# Patient Record
Sex: FEMALE | Race: WHITE | Employment: FULL TIME | URBAN - METROPOLITAN AREA
[De-identification: names, ages, dates, MRNs, and addresses within clinical notes are randomized per-mention and may not be internally consistent; named-entity substitution may affect disease eponyms.]

---

## 2019-07-22 ENCOUNTER — HOSPITAL ENCOUNTER (EMERGENCY)
Age: 49
Discharge: HOME | End: 2019-07-22
Payer: COMMERCIAL

## 2019-07-22 ENCOUNTER — APPOINTMENT (OUTPATIENT)
Dept: GENERAL RADIOLOGY | Age: 49
End: 2019-07-22
Payer: COMMERCIAL

## 2019-07-22 VITALS
DIASTOLIC BLOOD PRESSURE: 79 MMHG | HEIGHT: 67 IN | OXYGEN SATURATION: 100 % | BODY MASS INDEX: 29.19 KG/M2 | HEART RATE: 78 BPM | RESPIRATION RATE: 18 BRPM | WEIGHT: 186 LBS | TEMPERATURE: 98.1 F | SYSTOLIC BLOOD PRESSURE: 131 MMHG

## 2019-07-22 DIAGNOSIS — S62.639B OPEN FRACTURE OF TUFT OF DISTAL PHALANX OF FINGER: Primary | ICD-10-CM

## 2019-07-22 PROCEDURE — 73140 X-RAY EXAM OF FINGER(S): CPT

## 2019-07-22 PROCEDURE — 90715 TDAP VACCINE 7 YRS/> IM: CPT | Performed by: PHYSICIAN ASSISTANT

## 2019-07-22 PROCEDURE — 6370000000 HC RX 637 (ALT 250 FOR IP): Performed by: PHYSICIAN ASSISTANT

## 2019-07-22 PROCEDURE — 90471 IMMUNIZATION ADMIN: CPT | Performed by: PHYSICIAN ASSISTANT

## 2019-07-22 PROCEDURE — 6360000002 HC RX W HCPCS: Performed by: PHYSICIAN ASSISTANT

## 2019-07-22 PROCEDURE — 99283 EMERGENCY DEPT VISIT LOW MDM: CPT

## 2019-07-22 RX ORDER — CEPHALEXIN 500 MG/1
500 CAPSULE ORAL 3 TIMES DAILY
Qty: 21 CAPSULE | Refills: 0 | Status: SHIPPED | OUTPATIENT
Start: 2019-07-22 | End: 2019-07-29

## 2019-07-22 RX ORDER — ACETAMINOPHEN 500 MG
1000 TABLET ORAL ONCE
Status: COMPLETED | OUTPATIENT
Start: 2019-07-22 | End: 2019-07-22

## 2019-07-22 RX ORDER — TRAMADOL HYDROCHLORIDE 50 MG/1
50 TABLET ORAL ONCE
Status: COMPLETED | OUTPATIENT
Start: 2019-07-22 | End: 2019-07-22

## 2019-07-22 RX ORDER — TRAMADOL HYDROCHLORIDE 50 MG/1
50 TABLET ORAL EVERY 6 HOURS PRN
Qty: 12 TABLET | Refills: 0 | Status: SHIPPED | OUTPATIENT
Start: 2019-07-22 | End: 2019-07-25

## 2019-07-22 RX ORDER — CEPHALEXIN 250 MG/1
500 CAPSULE ORAL ONCE
Status: COMPLETED | OUTPATIENT
Start: 2019-07-22 | End: 2019-07-22

## 2019-07-22 RX ADMIN — CEPHALEXIN 500 MG: 250 CAPSULE ORAL at 22:32

## 2019-07-22 RX ADMIN — TETANUS TOXOID, REDUCED DIPHTHERIA TOXOID AND ACELLULAR PERTUSSIS VACCINE, ADSORBED 0.5 ML: 5; 2.5; 8; 8; 2.5 SUSPENSION INTRAMUSCULAR at 21:40

## 2019-07-22 RX ADMIN — ACETAMINOPHEN 1000 MG: 500 TABLET ORAL at 21:40

## 2019-07-22 RX ADMIN — TRAMADOL HYDROCHLORIDE 50 MG: 50 TABLET, FILM COATED ORAL at 22:50

## 2019-07-22 ASSESSMENT — PAIN SCALES - GENERAL
PAINLEVEL_OUTOF10: 8

## 2019-07-23 NOTE — ED PROVIDER NOTES
EMERGENCY DEPARTMENT ENCOUNTER        CHIEF COMPLAINT    Chief Complaint   Patient presents with    Hand Injury         HPI    Crystal Esparza is a 57 y.o. female who presents with a hand injury. Onset was just PTA. Context was patient smashed finger between table and wall. Has 8/10 pain to distal right 5th finger and bruising, laceration to medial nail fold. Patient reports associated bleeding. Patient is not up-to-date on Tetanus. REVIEW OF SYSTEMS    See HPI for further details. Review of systems otherwise negative. Constitutional:  Denies fever, chills. Musculoskeletal:  Denies edema. + finger pain  Integument:  + laceration  Neurologic:  Denies any numbness or tingling. PAST MEDICAL HISTORY    Past Medical History:   Diagnosis Date    Back pain     Ovarian cyst          SURGICAL HISTORY    Past Surgical History:   Procedure Laterality Date    KNEE SURGERY      TONSILLECTOMY      TUBAL LIGATION           CURRENT MEDICATIONS          ALLERGIES    Allergies   Allergen Reactions    Ibuprofen Nausea And Vomiting    Naproxen Nausea And Vomiting         FAMILY HISTORY    History reviewed. No pertinent family history.       SOCIAL HISTORY    Social History     Socioeconomic History    Marital status: Legally      Spouse name: None    Number of children: None    Years of education: None    Highest education level: None   Occupational History    None   Social Needs    Financial resource strain: None    Food insecurity:     Worry: None     Inability: None    Transportation needs:     Medical: None     Non-medical: None   Tobacco Use    Smoking status: Current Every Day Smoker     Packs/day: 1.00     Types: Cigarettes    Smokeless tobacco: Never Used   Substance and Sexual Activity    Alcohol use: Yes     Comment: occ    Drug use: Yes     Types: Marijuana    Sexual activity: None   Lifestyle    Physical activity:     Days per week: None     Minutes per session: None   

## 2019-07-24 ENCOUNTER — TELEPHONE (OUTPATIENT)
Dept: ORTHOPEDIC SURGERY | Age: 49
End: 2019-07-24

## 2019-07-29 ENCOUNTER — OFFICE VISIT (OUTPATIENT)
Dept: ORTHOPEDIC SURGERY | Age: 49
End: 2019-07-29
Payer: COMMERCIAL

## 2019-07-29 VITALS — BODY MASS INDEX: 29.98 KG/M2 | WEIGHT: 191 LBS | RESPIRATION RATE: 16 BRPM | HEIGHT: 67 IN

## 2019-07-29 DIAGNOSIS — S62.636B OPEN DISPLACED FRACTURE OF DISTAL PHALANX OF RIGHT LITTLE FINGER, INITIAL ENCOUNTER: Primary | ICD-10-CM

## 2019-07-29 PROCEDURE — G8427 DOCREV CUR MEDS BY ELIG CLIN: HCPCS | Performed by: PHYSICIAN ASSISTANT

## 2019-07-29 PROCEDURE — 26750 TREAT FINGER FRACTURE EACH: CPT | Performed by: PHYSICIAN ASSISTANT

## 2019-07-29 PROCEDURE — 99202 OFFICE O/P NEW SF 15 MIN: CPT | Performed by: PHYSICIAN ASSISTANT

## 2019-07-29 PROCEDURE — 4004F PT TOBACCO SCREEN RCVD TLK: CPT | Performed by: PHYSICIAN ASSISTANT

## 2019-07-29 PROCEDURE — G8419 CALC BMI OUT NRM PARAM NOF/U: HCPCS | Performed by: PHYSICIAN ASSISTANT

## 2019-07-29 RX ORDER — HYDROCODONE BITARTRATE AND ACETAMINOPHEN 5; 325 MG/1; MG/1
1 TABLET ORAL EVERY 8 HOURS PRN
Qty: 21 TABLET | Refills: 0 | Status: SHIPPED | OUTPATIENT
Start: 2019-07-29 | End: 2019-08-05

## 2019-07-29 ASSESSMENT — ENCOUNTER SYMPTOMS
RESPIRATORY NEGATIVE: 1
EYES NEGATIVE: 1
GASTROINTESTINAL NEGATIVE: 1

## 2019-07-29 NOTE — PROGRESS NOTES
I reviewed and agree with the portions of the HPI, review of systems, vital documentation and plan performed by my staff and have added/addended where appropriate. Review of Systems   Constitutional: Negative. HENT: Negative. Eyes: Negative. Respiratory: Negative. Cardiovascular: Negative. Gastrointestinal: Negative. Genitourinary: Negative. Musculoskeletal: Positive for arthralgias, joint swelling and myalgias. Skin: Negative. Neurological: Negative. Psychiatric/Behavioral: Negative. HPI:  Chasity Mackey is a 57y.o. year old female who is seen regarding an injury occurring on July 22nd 2019. She reports having her hand pinned against a wall by a steel end of a table while moving it and injuring the Right small finger. She was seen for Emergency evaluation and she was treated with finger foam splint, ultram, tylenol and keflex. Symptoms stable over time. She is right hand dominant. She states that the pain in her finger is a 7/10, throbbing, worse at night really. She denies any numbness or tingling in the injured extremity. Disorders to the injured extremity prior to this injury: negative for prior surgery, trauma, arthritis or disorders    Referred by ED for the above. Past Medical History:   Diagnosis Date    Back pain     Ovarian cyst        Past Surgical History:   Procedure Laterality Date    KNEE SURGERY      TONSILLECTOMY      TUBAL LIGATION         History reviewed. No pertinent family history.     Social History     Socioeconomic History    Marital status: Legally      Spouse name: None    Number of children: None    Years of education: None    Highest education level: None   Occupational History    None   Social Needs    Financial resource strain: None    Food insecurity:     Worry: None     Inability: None    Transportation needs:     Medical: None     Non-medical: None   Tobacco Use    Smoking status: Current Every Day Smoker     Packs/day: 1.00     Types: Cigarettes    Smokeless tobacco: Never Used   Substance and Sexual Activity    Alcohol use: Yes     Comment: occ    Drug use: Yes     Types: Marijuana    Sexual activity: None   Lifestyle    Physical activity:     Days per week: None     Minutes per session: None    Stress: None   Relationships    Social connections:     Talks on phone: None     Gets together: None     Attends Oriental orthodox service: None     Active member of club or organization: None     Attends meetings of clubs or organizations: None     Relationship status: None    Intimate partner violence:     Fear of current or ex partner: None     Emotionally abused: None     Physically abused: None     Forced sexual activity: None   Other Topics Concern    None   Social History Narrative    None       Current Outpatient Medications   Medication Sig Dispense Refill    cephALEXin (KEFLEX) 500 MG capsule Take 1 capsule by mouth 3 times daily for 7 days 21 capsule 0     No current facility-administered medications for this visit. Allergies   Allergen Reactions    Divalproex Sodium     Gabapentin     Methocarbamol     Pentazocine     Ibuprofen Nausea And Vomiting    Naproxen Nausea And Vomiting       Review of Systems:  See above      Physical Exam:  Ms. Nahid Huber's most recent vitals:  Vitals  Resp: 16  Height: 5' 7\" (170.2 cm)  Weight: 191 lb (86.6 kg)    Gait is normal    Gen/Psych: Examination reveals a pleasantindividual in no acute distress. The patient is oriented to time, place and person. The patient's mood and affect are appropriate.     Lymph: The lymphatic examination bilaterally reveals all areas to be without enlargement or induration.      Skin intact without lymphadenopathy, discoloration, or abnormal temperature.      Vascular: There is intact, symmetric circulation in both upper extremities.       right Hand exam  Inspection: Right small digit examined showing healed lacerations

## 2022-04-14 ENCOUNTER — OFFICE VISIT (OUTPATIENT)
Dept: ORTHOPEDIC SURGERY | Age: 52
End: 2022-04-14
Payer: COMMERCIAL

## 2022-04-14 VITALS
RESPIRATION RATE: 15 BRPM | HEIGHT: 67 IN | HEART RATE: 71 BPM | OXYGEN SATURATION: 99 % | BODY MASS INDEX: 29.98 KG/M2 | WEIGHT: 191 LBS

## 2022-04-14 DIAGNOSIS — M65.341 ACQUIRED TRIGGER FINGER OF RIGHT RING FINGER: Primary | ICD-10-CM

## 2022-04-14 DIAGNOSIS — M77.8 RIGHT WRIST TENDINITIS: ICD-10-CM

## 2022-04-14 PROCEDURE — G8427 DOCREV CUR MEDS BY ELIG CLIN: HCPCS | Performed by: ORTHOPAEDIC SURGERY

## 2022-04-14 PROCEDURE — G8419 CALC BMI OUT NRM PARAM NOF/U: HCPCS | Performed by: ORTHOPAEDIC SURGERY

## 2022-04-14 PROCEDURE — 4004F PT TOBACCO SCREEN RCVD TLK: CPT | Performed by: ORTHOPAEDIC SURGERY

## 2022-04-14 PROCEDURE — 99214 OFFICE O/P EST MOD 30 MIN: CPT | Performed by: ORTHOPAEDIC SURGERY

## 2022-04-14 NOTE — PROGRESS NOTES
4/14/2022   Chief Complaint   Patient presents with    Hand Pain     right ring trigger finger    Hand Pain     left hand        History of Present Illness:                             Reshma Pineda is a 54 y.o. female who presents today for evaluation of her bilateral right wrist and left hand pain. She complains of constant stiffness and swelling and pain along the palmar aspect of the right hand ring finger. She has triggering and popping during active range of motion is difficult time fully extending it. The finger frequently locks and has to pull it loose. She cannot perform gripping and lifting activities because of severe pain along the ring finger during gripping and lifting activities. Has a history of previous trauma to the right hand. She has noticed some swelling and pain along the dorsal aspect of the wrist especially with certain movements and extremes of motion of the wrist during flexion and extension. She initially had a traumatic fracture of the small finger which healed well with conservative measures but her wrist has been stiff and painful since her injury couple years ago. Patient presents to the office with bilateral hand pain. Pt stated her right is significantly worse than her left. Pt has a trigger finger on her ring finger that will latch and cause significant pain. Pt stated that she has used arthritis creams, ibuprofen and stretching with short-term relief. Pt has a autistic child with violent tendencies and stated that he will bend and put pressure on the hand and finger that will increase the pain dramatically. Pt denies any injuries recently with the hand. Medical History  Patient's medications, allergies, past medical, surgical, social and family histories were reviewed and updated as appropriate.     Past Medical History:   Diagnosis Date    Back pain     Ovarian cyst      Past Surgical History:   Procedure Laterality Date    KNEE SURGERY      TONSILLECTOMY      TUBAL LIGATION       No family history on file. Social History     Socioeconomic History    Marital status: Legally      Spouse name: None    Number of children: None    Years of education: None    Highest education level: None   Occupational History    None   Tobacco Use    Smoking status: Current Every Day Smoker     Packs/day: 1.00     Types: Cigarettes    Smokeless tobacco: Never Used   Substance and Sexual Activity    Alcohol use: Yes     Comment: occ    Drug use: Yes     Types: Marijuana Curlie Opal)    Sexual activity: None   Other Topics Concern    None   Social History Narrative    None     Social Determinants of Health     Financial Resource Strain:     Difficulty of Paying Living Expenses: Not on file   Food Insecurity:     Worried About Running Out of Food in the Last Year: Not on file    Carmen of Food in the Last Year: Not on file   Transportation Needs:     Lack of Transportation (Medical): Not on file    Lack of Transportation (Non-Medical):  Not on file   Physical Activity:     Days of Exercise per Week: Not on file    Minutes of Exercise per Session: Not on file   Stress:     Feeling of Stress : Not on file   Social Connections:     Frequency of Communication with Friends and Family: Not on file    Frequency of Social Gatherings with Friends and Family: Not on file    Attends Confucianism Services: Not on file    Active Member of [address] [place] [address] [employer] or Organizations: Not on file    Attends Club or Organization Meetings: Not on file    Marital Status: Not on file   Intimate Partner Violence:     Fear of Current or Ex-Partner: Not on file    Emotionally Abused: Not on file    Physically Abused: Not on file    Sexually Abused: Not on file   Housing Stability:     Unable to Pay for Housing in the Last Year: Not on file    Number of [place] in the Last Year: Not on file    Unstable Housing in the Last Year: Not on file     No current outpatient medications on file.     No current facility-administered medications for this visit. Allergies   Allergen Reactions    Bupropion Shortness Of Breath    Divalproex Sodium     Gabapentin     Methocarbamol     Pentazocine     Ibuprofen Nausea And Vomiting    Naproxen Nausea And Vomiting         Review of Systems   Constitutional: Negative for chills and fever. HENT: Negative for congestion and sneezing. Eyes: Negative for pain and redness. Respiratory: Negative for chest tightness, shortness of breath and wheezing. Cardiovascular: Negative for chest pain and palpitations. Gastrointestinal: Negative for vomiting. Musculoskeletal: Positive for arthralgias. Skin: Negative for color change and rash. Neurological: Negative for weakness and numbness. Psychiatric/Behavioral: Negative for agitation. The patient is not nervous/anxious. Examination:  General Exam:  Vitals: Pulse 71   Resp 15   Ht 5' 7\" (1.702 m)   Wt 191 lb (86.6 kg)   SpO2 99%   BMI 29.91 kg/m²    Physical Exam  Vitals and nursing note reviewed. Constitutional:       Appearance: Normal appearance. HENT:      Head: Normocephalic and atraumatic. Eyes:      Conjunctiva/sclera: Conjunctivae normal.      Pupils: Pupils are equal, round, and reactive to light. Pulmonary:      Effort: Pulmonary effort is normal.   Musculoskeletal:      Right elbow: No swelling or effusion. Normal range of motion. No tenderness. Right forearm: No swelling, edema, tenderness or bony tenderness. Left wrist: Normal.      Left hand: No swelling, deformity, tenderness or bony tenderness. Normal range of motion. Normal strength. Normal strength of finger abduction, thumb/finger opposition and wrist extension. Normal sensation. There is no disruption of two-point discrimination. Normal capillary refill. Cervical back: Normal range of motion.       Comments: Right Upper Extremity:    There is moderate to severe tenderness to palpation over the A1 pulley of the ring finger. There is mild swelling diffusely throughout the digit primarily along the course of the flexor tendon. There is active triggering with range of motion of the ring finger. There is pain along the volar aspect of the ring finger with passive stretch extension across the MP and PIP joints of the ring finger. Active range of motion is mildly limited both in flexion and extension across the MP and IP joints of the ring finger. Sensation is intact to light touch throughout the hand in the median, ulnar, radial nerve distributions. Skin is intact, no erythema or wounds. 2+ radial pulse. Strength is 5/5 throughout finger MP and IP flexion and extension in the hand    There is tenderness palpation along the dorsal ulnar aspect of the wrist primarily along the extensor carpi ulnaris tendon sheath at the level of the radiocarpal joint. There is pain with resisted wrist extension referred to the ulnar aspect of the wrist along the course of the extensor carpi ulnaris tendon. Strength is 5 out of 5 with wrist flexion and extension however there is pain referred to the ulnar aspect of the wrist during resisted wrist extension. Skin:     General: Skin is warm and dry. Neurological:      Mental Status: She is alert and oriented to person, place, and time.    Psychiatric:         Mood and Affect: Mood normal.         Behavior: Behavior normal.            Diagnostic testing:  X-ray images were reviewed by myself and discussed with the patient:  X-ray right hand:   3 views of the hand wrist show normal alignment of the articulations of    the fingers, wrist and carpal joints.  No evidence of fracture or acute    abnormality.  No soft tissue swelling.  No loose bodies.  Normal bone    density.       Impression: Normal right hand                   X-ray left hand:   3 views of the hand wrist show normal alignment of the articulations of    the fingers, wrist and carpal joints.  No evidence of fracture or acute    abnormality.  No soft tissue swelling.  No loose bodies.  Normal bone    density.       Impression: Normal left hand             Office Procedures:  No orders of the defined types were placed in this encounter. Assessment and Plan  1. Right ring finger trigger finger    2. Right wrist extensor carpi ulnaris tendinitis    We discussed the diagnosis of trigger finger at the right hand. We also discussed treatment options. Given the severity of her mechanical symptoms and stiffness present, I recommended surgical intervention for trigger finger release. We discussed the potential for the need of hand therapy in order to regain strength and range of motion of the finger even after successful release of the trigger finger. We also discussed the inflammatory condition that is present at the hand involving the wrist and small finger. I expect that this would respond to a steroid injection and rehabilitation activities with bracing as needed. I recommended we proceed with injection of the inflamed tendon sheath at the time of her trigger finger release. We discussed the goals of surgery as well as the rehabilitation. Discussed need for regular stretching exercises and advancement of activities following surgery. We discussed pertinent risks including wound healing problems, infection, stiffness, persistent pain, scar tissue, and other complications. She understands and would like to proceed. All of her questions were answered.     Plan will be to proceed with right ring finger trigger finger release and right wrist injection under monitored anesthesia care        Electronically signed by Hiro Palmer MD on 4/14/2022 at 9:30 AM

## 2022-04-14 NOTE — PATIENT INSTRUCTIONS
We will schedule surgery at soonest convenience.  If you have any questions regarding your surgery please call our office and ask to speak with Eric Hylton [phone]

## 2022-04-14 NOTE — PROGRESS NOTES
Patient presents to the office with bilateral hand pain. Pt stated her right is significantly worse than her left. Pt has a trigger finger on her ring finger that will latch and cause significant pain. Pt stated that she has used arthritis creams, ibuprofen and stretching with short-term relief. Pt has a autistic child with violent tendencies and stated that he will bend and put pressure on the hand and finger that will increase the pain dramatically. Pt denies any injuries recently with the hand.

## 2022-04-20 ASSESSMENT — ENCOUNTER SYMPTOMS
WHEEZING: 0
SHORTNESS OF BREATH: 0
EYE PAIN: 0
EYE REDNESS: 0
COLOR CHANGE: 0
CHEST TIGHTNESS: 0
VOMITING: 0

## 2022-04-22 NOTE — PROGRESS NOTES
Covid test done - 4/22/22    Surgery is at [facility] on 5/2/22 . You will be called on 4/29/22  with times. 1. Do not eat or drink anything after midnight - unless instructed by your doctor prior to surgery. This includes no water, chewing gum or mints. 2. Follow your directions as prescribed by the doctor for your procedure and medications. 3. Check with your Doctor regarding stopping vitamins, supplements, blood thinners (Plavix, Coumadin, Lovenox, Effient, Pradaxa, Xarelto, Fragmin or other blood thinners) and follow their instructions. Stop all supplements and herbals 7 days prior to surgery. Morning of surgery     4. Do not smoke, and do not drink any alcoholic beverages 24 hours prior to surgery. This includes NA Beer. 5. You may brush your teeth and gargle the morning of surgery. DO NOT SWALLOW WATER   6. You MUST make arrangements for a responsible adult to take you home after your surgery and be able to check on you every couple hours for the day. You will not be allowed to leave alone or drive yourself home. It is strongly suggested someone stay with you the first 24  hrs. Your surgery will be cancelled if you do not have a ride home. 7. Please wear simple, loose fitting clothing to the hospital.  Sophy Yadavley not bring valuables (money, credit cards, checkbooks, etc.) Do not wear any makeup (including no eye makeup) or nail polish on your fingers or toes. 8. DO NOT wear any jewelry or piercings on day of surgery. All body piercing jewelry must be removed. 9. If you have dentures, they will be removed before going to the OR; we will provide you a container. If you wear contact lenses or glasses,  they will be removed; please bring a case for them. 10. If you  have a Living Will and Durable Power of  for Healthcare, please bring in a copy.            11. Please bring picture ID,  insurance card, paperwork from the doctors office    (H & P, Consent, & card for implantable devices). 12. Take a shower the night before or morning of your procedure, do not apply any lotion, oil or powder. It is recommended to use Hibiclens or CHG wash during pre-op shower/bath.            13. Wear a mask covering your nose & mouth when entering the hospital.

## 2022-04-29 ENCOUNTER — ANESTHESIA EVENT (OUTPATIENT)
Dept: OPERATING ROOM | Age: 52
End: 2022-04-29
Payer: COMMERCIAL

## 2022-04-29 ASSESSMENT — LIFESTYLE VARIABLES: SMOKING_STATUS: 1

## 2022-04-29 NOTE — PROGRESS NOTES
Called and confirmed with patient surgery at [facility]  on 5/2/22 at [address] with an arrival time of   0615. Come into the Main entrance and check in. You can bring two people with you and wear a mask. Patient verbalized understanding.

## 2022-04-29 NOTE — ANESTHESIA PRE PROCEDURE
Department of Anesthesiology  Preprocedure Note       Name:  Johnathan Chiu   Age:  54 y.o.  :  1970                                          MRN:  [id number]         Date:  2022      Surgeon: Loki Torres):  Eloy Sarmiento MD    Procedure: Procedure(s):  RIGHT RING FINGER TRIGGER RELEASE  RIGHT WRIST INJECTION    Medications prior to admission:   Prior to Admission medications    Not on File       Current medications:    No current facility-administered medications for this encounter. No current outpatient medications on file. Allergies:     Allergies   Allergen Reactions    Bupropion Shortness Of Breath    Divalproex Sodium Other (See Comments)     Makes me feel horible    Gabapentin Other (See Comments)     Mood change    Methocarbamol Other (See Comments)     Makes me sleep and groggy, with dizziness    Ibuprofen Nausea And Vomiting    Naproxen Nausea And Vomiting       Problem List:    Patient Active Problem List   Diagnosis Code    Acquired trigger finger of right ring finger M65.341    Right wrist tendinitis M77.8       Past Medical History:        Diagnosis Date    Back pain     Ovarian cyst        Past Surgical History:        Procedure Laterality Date    KNEE SURGERY Right     two times - Scoped and ACL repaired     KNEE SURGERY Left     scoped    TONSILLECTOMY      TUBAL LIGATION         Social History:    Social History     Tobacco Use    Smoking status: Current Every Day Smoker     Packs/day: 1.00     Types: Cigarettes    Smokeless tobacco: Never Used   Substance Use Topics    Alcohol use: Yes     Comment: occ                                Ready to quit: Not Answered  Counseling given: Not Answered      Vital Signs (Current):   Vitals:    22 1305   Weight: 190 lb (86.2 kg)   Height: 5' 7\" (1.702 m)                                              BP Readings from Last 3 Encounters:   19 131/79   18 (!) 128/59   18 124/67       NPO Status: BMI:   Wt Readings from Last 3 Encounters:   04/14/22 191 lb (86.6 kg)   07/29/19 191 lb (86.6 kg)   07/22/19 186 lb (84.4 kg)     Body mass index is 29.76 kg/m². CBC:   Lab Results   Component Value Date    WBC 16.2 09/09/2018    RBC 4.85 09/09/2018    HGB 15.3 09/09/2018    HCT 44.4 09/09/2018    MCV 91.5 09/09/2018    RDW 13.2 09/09/2018     09/09/2018       CMP:   Lab Results   Component Value Date     09/09/2018    K 3.4 09/09/2018     09/09/2018    CO2 21 09/09/2018    BUN 7 09/09/2018    CREATININE 0.6 09/09/2018    GFRAA >60 09/09/2018    LABGLOM >60 09/09/2018    GLUCOSE 109 09/09/2018    PROT 7.3 09/09/2018    CALCIUM 9.6 09/09/2018    BILITOT 0.6 09/09/2018    ALKPHOS 85 09/09/2018    AST 10 09/09/2018    ALT 8 09/09/2018       POC Tests: No results for input(s): POCGLU, POCNA, POCK, POCCL, POCBUN, POCHEMO, POCHCT in the last 72 hours. Coags: No results found for: PROTIME, INR, APTT    HCG (If Applicable): No results found for: PREGTESTUR, PREGSERUM, HCG, HCGQUANT     ABGs: No results found for: PHART, PO2ART, BAP7BYA, VYE6PXW, BEART, G6FABTRE     Type & Screen (If Applicable):  No results found for: LABABO, LABRH    Drug/Infectious Status (If Applicable):  No results found for: HIV, HEPCAB    COVID-19 Screening (If Applicable): No results found for: COVID19        Anesthesia Evaluation    Airway: Mallampati: II  TM distance: >3 FB   Neck ROM: full  Mouth opening: > = 3 FB Dental:    (+) edentulous      Pulmonary:normal exam    (+) current smoker                           Cardiovascular:                      Neuro/Psych:               GI/Hepatic/Renal:             Endo/Other:                     Abdominal:             Vascular: Other Findings:           Anesthesia Plan      MAC     ASA 2     (Chart review)  Induction: intravenous. MIPS: Postoperative opioids intended.   Anesthetic plan and risks discussed with patient. Plan discussed with CRNA.     Attending anesthesiologist reviewed and agrees with Pre Eval content            PAULINE Atwood - CRNA   4/29/2022

## 2022-05-02 ENCOUNTER — ANESTHESIA (OUTPATIENT)
Dept: OPERATING ROOM | Age: 52
End: 2022-05-02
Payer: COMMERCIAL

## 2022-05-02 ENCOUNTER — HOSPITAL ENCOUNTER (OUTPATIENT)
Age: 52
Setting detail: OUTPATIENT SURGERY
Discharge: HOME | End: 2022-05-02
Attending: ORTHOPAEDIC SURGERY | Admitting: ORTHOPAEDIC SURGERY
Payer: COMMERCIAL

## 2022-05-02 VITALS
TEMPERATURE: 98.6 F | SYSTOLIC BLOOD PRESSURE: 86 MMHG | RESPIRATION RATE: 3 BRPM | DIASTOLIC BLOOD PRESSURE: 51 MMHG | OXYGEN SATURATION: 97 %

## 2022-05-02 VITALS
OXYGEN SATURATION: 99 % | RESPIRATION RATE: 16 BRPM | WEIGHT: 190 LBS | BODY MASS INDEX: 29.82 KG/M2 | SYSTOLIC BLOOD PRESSURE: 115 MMHG | HEART RATE: 66 BPM | DIASTOLIC BLOOD PRESSURE: 55 MMHG | HEIGHT: 67 IN | TEMPERATURE: 97.6 F

## 2022-05-02 DIAGNOSIS — M65.341 ACQUIRED TRIGGER FINGER OF RIGHT RING FINGER: ICD-10-CM

## 2022-05-02 DIAGNOSIS — Z01.818 ENCOUNTER FOR PREADMISSION TESTING: Primary | ICD-10-CM

## 2022-05-02 PROCEDURE — 2500000003 HC RX 250 WO HCPCS: Performed by: NURSE ANESTHETIST, CERTIFIED REGISTERED

## 2022-05-02 PROCEDURE — 3600000012 HC SURGERY LEVEL 2 ADDTL 15MIN: Performed by: ORTHOPAEDIC SURGERY

## 2022-05-02 PROCEDURE — 20605 DRAIN/INJ JOINT/BURSA W/O US: CPT | Performed by: ORTHOPAEDIC SURGERY

## 2022-05-02 PROCEDURE — 26055 INCISE FINGER TENDON SHEATH: CPT | Performed by: ORTHOPAEDIC SURGERY

## 2022-05-02 PROCEDURE — 6360000002 HC RX W HCPCS: Performed by: ORTHOPAEDIC SURGERY

## 2022-05-02 PROCEDURE — 26055 INCISE FINGER TENDON SHEATH: CPT

## 2022-05-02 PROCEDURE — 3600000002 HC SURGERY LEVEL 2 BASE: Performed by: ORTHOPAEDIC SURGERY

## 2022-05-02 PROCEDURE — 6360000002 HC RX W HCPCS: Performed by: NURSE ANESTHETIST, CERTIFIED REGISTERED

## 2022-05-02 PROCEDURE — 7100000011 HC PHASE II RECOVERY - ADDTL 15 MIN: Performed by: ORTHOPAEDIC SURGERY

## 2022-05-02 PROCEDURE — 2709999900 HC NON-CHARGEABLE SUPPLY: Performed by: ORTHOPAEDIC SURGERY

## 2022-05-02 PROCEDURE — 2580000003 HC RX 258: Performed by: ANESTHESIOLOGY

## 2022-05-02 PROCEDURE — 3700000000 HC ANESTHESIA ATTENDED CARE: Performed by: ORTHOPAEDIC SURGERY

## 2022-05-02 PROCEDURE — 2500000003 HC RX 250 WO HCPCS: Performed by: ORTHOPAEDIC SURGERY

## 2022-05-02 PROCEDURE — 3700000001 HC ADD 15 MINUTES (ANESTHESIA): Performed by: ORTHOPAEDIC SURGERY

## 2022-05-02 PROCEDURE — 7100000001 HC PACU RECOVERY - ADDTL 15 MIN: Performed by: ORTHOPAEDIC SURGERY

## 2022-05-02 PROCEDURE — 7100000000 HC PACU RECOVERY - FIRST 15 MIN: Performed by: ORTHOPAEDIC SURGERY

## 2022-05-02 PROCEDURE — 7100000010 HC PHASE II RECOVERY - FIRST 15 MIN: Performed by: ORTHOPAEDIC SURGERY

## 2022-05-02 RX ORDER — FENTANYL CITRATE 50 UG/ML
INJECTION, SOLUTION INTRAMUSCULAR; INTRAVENOUS PRN
Status: DISCONTINUED | OUTPATIENT
Start: 2022-05-02 | End: 2022-05-02 | Stop reason: SDUPTHER

## 2022-05-02 RX ORDER — MIDAZOLAM HYDROCHLORIDE 1 MG/ML
INJECTION INTRAMUSCULAR; INTRAVENOUS PRN
Status: DISCONTINUED | OUTPATIENT
Start: 2022-05-02 | End: 2022-05-02 | Stop reason: SDUPTHER

## 2022-05-02 RX ORDER — HYDROCODONE BITARTRATE AND ACETAMINOPHEN 5; 325 MG/1; MG/1
1 TABLET ORAL EVERY 6 HOURS PRN
Qty: 12 TABLET | Refills: 0 | Status: SHIPPED | OUTPATIENT
Start: 2022-05-02 | End: 2022-05-05

## 2022-05-02 RX ORDER — SODIUM CHLORIDE, SODIUM LACTATE, POTASSIUM CHLORIDE, CALCIUM CHLORIDE 600; 310; 30; 20 MG/100ML; MG/100ML; MG/100ML; MG/100ML
INJECTION, SOLUTION INTRAVENOUS CONTINUOUS
Status: DISCONTINUED | OUTPATIENT
Start: 2022-05-02 | End: 2022-05-02 | Stop reason: HOSPADM

## 2022-05-02 RX ORDER — TRIAMCINOLONE ACETONIDE 40 MG/ML
INJECTION, SUSPENSION INTRA-ARTICULAR; INTRAMUSCULAR
Status: COMPLETED | OUTPATIENT
Start: 2022-05-02 | End: 2022-05-02

## 2022-05-02 RX ORDER — MEPERIDINE HYDROCHLORIDE 25 MG/ML
12.5 INJECTION INTRAMUSCULAR; INTRAVENOUS; SUBCUTANEOUS EVERY 5 MIN PRN
Status: DISCONTINUED | OUTPATIENT
Start: 2022-05-02 | End: 2022-05-02 | Stop reason: HOSPADM

## 2022-05-02 RX ORDER — MIDAZOLAM HYDROCHLORIDE 2 MG/2ML
2 INJECTION, SOLUTION INTRAMUSCULAR; INTRAVENOUS
Status: DISCONTINUED | OUTPATIENT
Start: 2022-05-02 | End: 2022-05-02 | Stop reason: HOSPADM

## 2022-05-02 RX ORDER — LIDOCAINE HYDROCHLORIDE 20 MG/ML
INJECTION, SOLUTION EPIDURAL; INFILTRATION; INTRACAUDAL; PERINEURAL PRN
Status: DISCONTINUED | OUTPATIENT
Start: 2022-05-02 | End: 2022-05-02 | Stop reason: SDUPTHER

## 2022-05-02 RX ORDER — KETOROLAC TROMETHAMINE 30 MG/ML
INJECTION, SOLUTION INTRAMUSCULAR; INTRAVENOUS PRN
Status: DISCONTINUED | OUTPATIENT
Start: 2022-05-02 | End: 2022-05-02 | Stop reason: SDUPTHER

## 2022-05-02 RX ORDER — HYDRALAZINE HYDROCHLORIDE 20 MG/ML
10 INJECTION INTRAMUSCULAR; INTRAVENOUS
Status: DISCONTINUED | OUTPATIENT
Start: 2022-05-02 | End: 2022-05-02 | Stop reason: HOSPADM

## 2022-05-02 RX ORDER — DEXTROSE MONOHYDRATE 25 G/50ML
12.5 INJECTION, SOLUTION INTRAVENOUS PRN
Status: DISCONTINUED | OUTPATIENT
Start: 2022-05-02 | End: 2022-05-02 | Stop reason: RX

## 2022-05-02 RX ORDER — ONDANSETRON 2 MG/ML
4 INJECTION INTRAMUSCULAR; INTRAVENOUS
Status: DISCONTINUED | OUTPATIENT
Start: 2022-05-02 | End: 2022-05-02 | Stop reason: HOSPADM

## 2022-05-02 RX ORDER — DEXAMETHASONE SODIUM PHOSPHATE 4 MG/ML
INJECTION, SOLUTION INTRA-ARTICULAR; INTRALESIONAL; INTRAMUSCULAR; INTRAVENOUS; SOFT TISSUE PRN
Status: DISCONTINUED | OUTPATIENT
Start: 2022-05-02 | End: 2022-05-02 | Stop reason: SDUPTHER

## 2022-05-02 RX ORDER — FENTANYL CITRATE 50 UG/ML
50 INJECTION, SOLUTION INTRAMUSCULAR; INTRAVENOUS EVERY 5 MIN PRN
Status: DISCONTINUED | OUTPATIENT
Start: 2022-05-02 | End: 2022-05-02 | Stop reason: HOSPADM

## 2022-05-02 RX ORDER — DIPHENHYDRAMINE HYDROCHLORIDE 50 MG/ML
12.5 INJECTION INTRAMUSCULAR; INTRAVENOUS
Status: DISCONTINUED | OUTPATIENT
Start: 2022-05-02 | End: 2022-05-02 | Stop reason: HOSPADM

## 2022-05-02 RX ORDER — SODIUM CHLORIDE 9 MG/ML
25 INJECTION, SOLUTION INTRAVENOUS PRN
Status: DISCONTINUED | OUTPATIENT
Start: 2022-05-02 | End: 2022-05-02 | Stop reason: HOSPADM

## 2022-05-02 RX ORDER — IPRATROPIUM BROMIDE AND ALBUTEROL SULFATE 2.5; .5 MG/3ML; MG/3ML
1 SOLUTION RESPIRATORY (INHALATION)
Status: DISCONTINUED | OUTPATIENT
Start: 2022-05-02 | End: 2022-05-02 | Stop reason: HOSPADM

## 2022-05-02 RX ORDER — SODIUM CHLORIDE 0.9 % (FLUSH) 0.9 %
5-40 SYRINGE (ML) INJECTION PRN
Status: DISCONTINUED | OUTPATIENT
Start: 2022-05-02 | End: 2022-05-02 | Stop reason: HOSPADM

## 2022-05-02 RX ORDER — ACETAMINOPHEN 500 MG
500 TABLET ORAL PRN
COMMUNITY

## 2022-05-02 RX ORDER — ONDANSETRON 2 MG/ML
INJECTION INTRAMUSCULAR; INTRAVENOUS PRN
Status: DISCONTINUED | OUTPATIENT
Start: 2022-05-02 | End: 2022-05-02 | Stop reason: SDUPTHER

## 2022-05-02 RX ORDER — OXYCODONE HYDROCHLORIDE 5 MG/1
5 TABLET ORAL
Status: DISCONTINUED | OUTPATIENT
Start: 2022-05-02 | End: 2022-05-02 | Stop reason: HOSPADM

## 2022-05-02 RX ORDER — PROPOFOL 10 MG/ML
INJECTION, EMULSION INTRAVENOUS PRN
Status: DISCONTINUED | OUTPATIENT
Start: 2022-05-02 | End: 2022-05-02 | Stop reason: SDUPTHER

## 2022-05-02 RX ORDER — NICOTINE POLACRILEX 4 MG
15 LOZENGE BUCCAL PRN
Status: DISCONTINUED | OUTPATIENT
Start: 2022-05-02 | End: 2022-05-02 | Stop reason: HOSPADM

## 2022-05-02 RX ORDER — SODIUM CHLORIDE 0.9 % (FLUSH) 0.9 %
5-40 SYRINGE (ML) INJECTION EVERY 12 HOURS SCHEDULED
Status: DISCONTINUED | OUTPATIENT
Start: 2022-05-02 | End: 2022-05-02 | Stop reason: HOSPADM

## 2022-05-02 RX ORDER — DEXTROSE MONOHYDRATE 50 MG/ML
100 INJECTION, SOLUTION INTRAVENOUS PRN
Status: DISCONTINUED | OUTPATIENT
Start: 2022-05-02 | End: 2022-05-02 | Stop reason: HOSPADM

## 2022-05-02 RX ORDER — CEFAZOLIN SODIUM 2 G/100ML
2000 INJECTION, SOLUTION INTRAVENOUS
Status: COMPLETED | OUTPATIENT
Start: 2022-05-02 | End: 2022-05-02

## 2022-05-02 RX ADMIN — PROPOFOL 300 MG: 10 INJECTION, EMULSION INTRAVENOUS at 08:30

## 2022-05-02 RX ADMIN — CEFAZOLIN SODIUM 2000 MG: 2 INJECTION, SOLUTION INTRAVENOUS at 08:19

## 2022-05-02 RX ADMIN — ONDANSETRON 4 MG: 2 INJECTION INTRAMUSCULAR; INTRAVENOUS at 08:42

## 2022-05-02 RX ADMIN — PROPOFOL 80 MG: 10 INJECTION, EMULSION INTRAVENOUS at 08:26

## 2022-05-02 RX ADMIN — KETOROLAC TROMETHAMINE 30 MG: 30 INJECTION, SOLUTION INTRAMUSCULAR at 08:55

## 2022-05-02 RX ADMIN — MIDAZOLAM 2 MG: 1 INJECTION INTRAMUSCULAR; INTRAVENOUS at 08:31

## 2022-05-02 RX ADMIN — SODIUM CHLORIDE, POTASSIUM CHLORIDE, SODIUM LACTATE AND CALCIUM CHLORIDE: 600; 310; 30; 20 INJECTION, SOLUTION INTRAVENOUS at 06:57

## 2022-05-02 RX ADMIN — PROPOFOL 150 MG: 10 INJECTION, EMULSION INTRAVENOUS at 08:41

## 2022-05-02 RX ADMIN — FENTANYL CITRATE 100 MCG: 50 INJECTION, SOLUTION INTRAMUSCULAR; INTRAVENOUS at 08:41

## 2022-05-02 RX ADMIN — LIDOCAINE HYDROCHLORIDE 100 MG: 20 INJECTION, SOLUTION EPIDURAL; INFILTRATION; INTRACAUDAL at 08:26

## 2022-05-02 RX ADMIN — FENTANYL CITRATE 50 MCG: 50 INJECTION, SOLUTION INTRAMUSCULAR; INTRAVENOUS at 08:26

## 2022-05-02 RX ADMIN — DEXAMETHASONE SODIUM PHOSPHATE 8 MG: 4 INJECTION, SOLUTION INTRAMUSCULAR; INTRAVENOUS at 08:42

## 2022-05-02 RX ADMIN — FENTANYL CITRATE 50 MCG: 50 INJECTION, SOLUTION INTRAMUSCULAR; INTRAVENOUS at 08:31

## 2022-05-02 RX ADMIN — PROPOFOL 30 MG: 10 INJECTION, EMULSION INTRAVENOUS at 08:28

## 2022-05-02 ASSESSMENT — PULMONARY FUNCTION TESTS
PIF_VALUE: 0
PIF_VALUE: 17
PIF_VALUE: 0
PIF_VALUE: 28
PIF_VALUE: 0
PIF_VALUE: 1
PIF_VALUE: 0
PIF_VALUE: 18
PIF_VALUE: 0
PIF_VALUE: 17
PIF_VALUE: 17
PIF_VALUE: 8
PIF_VALUE: 17
PIF_VALUE: 7
PIF_VALUE: 0
PIF_VALUE: 17
PIF_VALUE: 0
PIF_VALUE: 17
PIF_VALUE: 0
PIF_VALUE: 2
PIF_VALUE: 17
PIF_VALUE: 17
PIF_VALUE: 2
PIF_VALUE: 0
PIF_VALUE: 1
PIF_VALUE: 1
PIF_VALUE: 25
PIF_VALUE: 0
PIF_VALUE: 17
PIF_VALUE: 0
PIF_VALUE: 23
PIF_VALUE: 18
PIF_VALUE: 1
PIF_VALUE: 0
PIF_VALUE: 17
PIF_VALUE: 0
PIF_VALUE: 20
PIF_VALUE: 18
PIF_VALUE: 0

## 2022-05-02 ASSESSMENT — PAIN DESCRIPTION - DESCRIPTORS: DESCRIPTORS: ACHING

## 2022-05-02 ASSESSMENT — PAIN - FUNCTIONAL ASSESSMENT: PAIN_FUNCTIONAL_ASSESSMENT: 0-10

## 2022-05-02 ASSESSMENT — PAIN SCALES - GENERAL
PAINLEVEL_OUTOF10: 0
PAINLEVEL_OUTOF10: 0

## 2022-05-02 NOTE — PROGRESS NOTES
1035:  Discharge instructions discussed with patient, verbalized an understanding. 1040:  Pt discharged to home alert and oriented with no c/o pain to right hand. ACE drsg in place to right hand with no bleeding present. Pt states right hand remains numb, patient is able to move fingers on command. VSS, pt tolerating PO.

## 2022-05-02 NOTE — PROGRESS NOTES
4723 Patient arrived to PACU, monitors placed and alarms on. Report received from [employer]. Patient drowsy but aroused easily. Right hand elevated on pillow, denies any pain. 0930 Patient tolerating ice chips, denies any nausea. Patient turned and repositioned. 4213 Transport to [facility] room 22  2510 Report given to Stefani Augustine at bedside.

## 2022-05-02 NOTE — OP NOTE
Operative Note      Patient: Anabel James  YOB: 1970  MRN: [id number]    Date of Procedure: 5/2/2022    Pre-Op Diagnosis: RIGHT RING FINGER TRIGGER FINGER , RIGHT WRIST EXTENSOR CARPI , ULNARS TENDINITIS    Post-Op Diagnosis: Same       Procedure(s):  RIGHT RING FINGER TRIGGER RELEASE  RIGHT WRIST INJECTION    Surgeon(s):  Candi Negrete MD    Assistant:   Physician Assistant: Yvonne Richard PA-C    Anesthesia: Monitor Anesthesia Care    Estimated Blood Loss (mL): Minimal    Complications: None    Specimens:   * No specimens in log *    Implants:  * No implants in log *      Drains: * No LDAs found *    Findings:     Detailed Description of Procedure:     Patient was identified the preoperative area and site was marked. She was taken back to the operating room placed supine the table. Conscious sedation was performed and then the hand was prepped with alcohol. Timeout was performed and preoperative antibiotics were given. The trigger finger injection site was injected with 10 mL of an equal mixture of quarter percent plain Marcaine and 1% plain lidocaine. The dorsal ulnar aspect of the wrist was prepped with alcohol. The right wrist TFCC joint was injected using a 25-gauge needle and 40 mg of Kenalog and 2 mL of 1% plain lidocaine was injected at the ulnar aspect of the wrist divided into the TFCC and extensor carpi ulnaris tendon sheath with half of the injection divided into each through the same injection site. Next the right upper extremities prepped and draped sterile fashion. Arm tourniquet was applied. The patient was having a difficult time holding still and anesthesia felt that it was necessary to proceed with an LMA to ensure patient was able to be still for the trigger finger release procedure.     The right upper extremity was exsanguinated Esmarch and tourniquet was inflated to 250 mmHg and deflated the conclusion of the case after less than 10 minutes of tourniquet time.    Incision was made over the palmar aspect of the hand at the level of the A1 pulley of the ring finger. Dissection was carried down to subcutaneous tissues and bleeding was well controlled. Retractors were placed on either side of the flexor tendon sheath and A1 pulley protecting neurovascular bundles. The trigger finger was released along its mid aspect of the A1 pulley with a 15 blade under direct visualization and release was carried out proximally and distally in order to release the flexor tendons. Each tendon was delivered individually into the wound and there were prominent adhesions present between the 2 flexor tendons that were cleared with a dissecting scissors. The tendons appeared healthy and glided well following the release. The wound was thoroughly irrigated. Skin was closed with interrupted horizontal mattress 4-0 nylon sutures. Tourniquet was deflated and bleeding was well controlled. A sterile dressing was applied with Xeroform, 4 x 8's, sterile web roll, and a Ace wrap. Patient was extubated and taken to PACU in stable condition. All sponge and needle counts were correct. Postoperative plan:  Patient will be allowed to do light activities with early range of motion and stretching to the finger.   She will follow-up in the office in 10 days for suture removal.    Electronically signed by Mariela Flores MD on 5/2/2022 at 9:17 AM

## 2022-05-02 NOTE — H&P
Chief Complaint   Patient presents with    Hand Pain       right ring trigger finger    Hand Pain       left hand         History of Present Illness:                             Mikki Allen is a 54 y.o. female who presents today for evaluation of her bilateral right wrist and left hand pain. She complains of constant stiffness and swelling and pain along the palmar aspect of the right hand ring finger. She has triggering and popping during active range of motion is difficult time fully extending it. The finger frequently locks and has to pull it loose. She cannot perform gripping and lifting activities because of severe pain along the ring finger during gripping and lifting activities.     Has a history of previous trauma to the right hand. She has noticed some swelling and pain along the dorsal aspect of the wrist especially with certain movements and extremes of motion of the wrist during flexion and extension. She initially had a traumatic fracture of the small finger which healed well with conservative measures but her wrist has been stiff and painful since her injury couple years ago.     Patient presents to the office with bilateral hand pain. Pt stated her right is significantly worse than her left. Pt has a trigger finger on her ring finger that will latch and cause significant pain. Pt stated that she has used arthritis creams, ibuprofen and stretching with short-term relief. Pt has a autistic child with violent tendencies and stated that he will bend and put pressure on the hand and finger that will increase the pain dramatically.  Pt denies any injuries recently with the hand.           Medical History  Patient's medications, allergies, past medical, surgical, social and family histories were reviewed and updated as appropriate.     Past Medical History        Past Medical History:   Diagnosis Date    Back pain      Ovarian cyst           Past Surgical History         Past Surgical History: Procedure Laterality Date    KNEE SURGERY        TONSILLECTOMY        TUBAL LIGATION             Family History   No family history on file. Social History   Social History            Socioeconomic History    Marital status: Legally        Spouse name: None    Number of children: None    Years of education: None    Highest education level: None   Occupational History    None   Tobacco Use    Smoking status: Current Every Day Smoker       Packs/day: 1.00       Types: Cigarettes    Smokeless tobacco: Never Used   Substance and Sexual Activity    Alcohol use: Yes       Comment: occ    Drug use: Yes       Types: Marijuana Lovena Mems)    Sexual activity: None   Other Topics Concern    None   Social History Narrative    None      Social Determinants of Health          Financial Resource Strain:     Difficulty of Paying Living Expenses: Not on file   Food Insecurity:     Worried About Running Out of Food in the Last Year: Not on file    Carmen of Food in the Last Year: Not on file   Transportation Needs:     Lack of Transportation (Medical): Not on file    Lack of Transportation (Non-Medical):  Not on file   Physical Activity:     Days of Exercise per Week: Not on file    Minutes of Exercise per Session: Not on file   Stress:     Feeling of Stress : Not on file   Social Connections:     Frequency of Communication with Friends and Family: Not on file    Frequency of Social Gatherings with Friends and Family: Not on file    Attends Gnosticism Services: Not on file    Active Member of Clubs or Organizations: Not on file    Attends Club or Organization Meetings: Not on file    Marital Status: Not on file   Intimate Partner Violence:     Fear of Current or Ex-Partner: Not on file    Emotionally Abused: Not on file    Physically Abused: Not on file    Sexually Abused: Not on file   Housing Stability:     Unable to Pay for Housing in the Last Year: Not on file    Number of [facility] in the Last Year: Not on file    Unstable Housing in the Last Year: Not on file         Current Facility-Administered Medications   No current outpatient medications on file.      No current facility-administered medications for this visit.              Allergies   Allergen Reactions    Bupropion Shortness Of Breath    Divalproex Sodium      Gabapentin      Methocarbamol      Pentazocine      Ibuprofen Nausea And Vomiting    Naproxen Nausea And Vomiting            Review of Systems   Constitutional: Negative for chills and fever. HENT: Negative for congestion and sneezing. Eyes: Negative for pain and redness. Respiratory: Negative for chest tightness, shortness of breath and wheezing. Cardiovascular: Negative for chest pain and palpitations. Gastrointestinal: Negative for vomiting. Musculoskeletal: Positive for arthralgias. Skin: Negative for color change and rash. Neurological: Negative for weakness and numbness. Psychiatric/Behavioral: Negative for agitation. The patient is not nervous/anxious.                                                  Examination:  General Exam:  Vitals: Pulse 71   Resp 15   Ht 5' 7\" (1.702 m)   Wt 191 lb (86.6 kg)   SpO2 99%   BMI 29.91 kg/m²    Physical Exam  Vitals and nursing note reviewed. Constitutional:       Appearance: Normal appearance. HENT:      Head: Normocephalic and atraumatic. Eyes:      Conjunctiva/sclera: Conjunctivae normal.      Pupils: Pupils are equal, round, and reactive to light. Pulmonary:      Effort: Pulmonary effort is normal.   Musculoskeletal:      Right elbow: No swelling or effusion. Normal range of motion. No tenderness. Right forearm: No swelling, edema, tenderness or bony tenderness. Left wrist: Normal.      Left hand: No swelling, deformity, tenderness or bony tenderness. Normal range of motion. Normal strength. Normal strength of finger abduction, thumb/finger opposition and wrist extension.  Normal sensation. There is no disruption of two-point discrimination. Normal capillary refill. Cervical back: Normal range of motion. Comments: Right Upper Extremity:    There is moderate to severe tenderness to palpation over the A1 pulley of the ring finger. There is mild swelling diffusely throughout the digit primarily along the course of the flexor tendon. There is active triggering with range of motion of the ring finger. There is pain along the volar aspect of the ring finger with passive stretch extension across the MP and PIP joints of the ring finger. Active range of motion is mildly limited both in flexion and extension across the MP and IP joints of the ring finger. Sensation is intact to light touch throughout the hand in the median, ulnar, radial nerve distributions. Skin is intact, no erythema or wounds. 2+ radial pulse. Strength is 5/5 throughout finger MP and IP flexion and extension in the hand    There is tenderness palpation along the dorsal ulnar aspect of the wrist primarily along the extensor carpi ulnaris tendon sheath at the level of the radiocarpal joint. There is pain with resisted wrist extension referred to the ulnar aspect of the wrist along the course of the extensor carpi ulnaris tendon. Strength is 5 out of 5 with wrist flexion and extension however there is pain referred to the ulnar aspect of the wrist during resisted wrist extension. Skin:     General: Skin is warm and dry. Neurological:      Mental Status: She is alert and oriented to person, place, and time. Psychiatric:         Mood and Affect: Mood normal.         Behavior: Behavior normal.               Diagnostic testing:  X-ray images were reviewed by myself and discussed with the patient:  X-ray right hand:   3 views of the hand wrist show normal alignment of the articulations of    the fingers, wrist and carpal joints.  No evidence of fracture or acute    abnormality.  No soft tissue swelling.  No loose bodies.  Normal bone    density.       Impression: Normal right hand                       X-ray left hand:   3 views of the hand wrist show normal alignment of the articulations of    the fingers, wrist and carpal joints.  No evidence of fracture or acute    abnormality.  No soft tissue swelling.  No loose bodies.  Normal bone    density.       Impression: Normal left hand                Office Procedures:  No orders of the defined types were placed in this encounter.        Assessment and Plan  1. Right ring finger trigger finger     2. Right wrist extensor carpi ulnaris tendinitis     We discussed the diagnosis of trigger finger at the right hand. We also discussed treatment options. Given the severity of her mechanical symptoms and stiffness present, I recommended surgical intervention for trigger finger release. We discussed the potential for the need of hand therapy in order to regain strength and range of motion of the finger even after successful release of the trigger finger.     We also discussed the inflammatory condition that is present at the hand involving the wrist and small finger. I expect that this would respond to a steroid injection and rehabilitation activities with bracing as needed. I recommended we proceed with injection of the inflamed tendon sheath at the time of her trigger finger release.     We discussed the goals of surgery as well as the rehabilitation. Discussed need for regular stretching exercises and advancement of activities following surgery. We discussed pertinent risks including wound healing problems, infection, stiffness, persistent pain, scar tissue, and other complications. She understands and would like to proceed.   All of her questions were answered.     Plan will be to proceed with right ring finger trigger finger release and right wrist injection under monitored anesthesia care       History and Physical exam note from the office visit is copied above from [facility]. I have reviewed the note and examined the patient and find no relevant changes.      I have reviewed with the patient and/or family the risks, benefits, and alternatives to the procedure as well as expected postoperative course    Milena Paulino MD

## 2022-05-04 NOTE — ANESTHESIA POSTPROCEDURE EVALUATION
Department of Anesthesiology  Postprocedure Note    Patient: Desiree Lozada  MRN: [id number]  YOB: 1970  Date of evaluation: 5/4/2022  Time:  8:40 AM     Procedure Summary     Date: 05/02/22 Room / Location: [address] / [facility]    Anesthesia Start: 4400 Anesthesia Stop: 0919    Procedures:       RIGHT RING FINGER TRIGGER RELEASE (Right )      RIGHT WRIST INJECTION (Right ) Diagnosis: (RIGHT RING FINGER TRIGGER FINGER , RIGHT WRIST EXTENSOR CARPI , ULNARS TENDINITIS)    Surgeons: Earney Severs, MD Responsible Provider: Annalee Tejada MD    Anesthesia Type: MAC ASA Status: 2          Anesthesia Type: MAC    Daniel Phase I: Daniel Score: 10    Daniel Phase II: Daniel Score: 10    Last vitals: Reviewed and per EMR flowsheets.        Anesthesia Post Evaluation    Patient location during evaluation: PACU  Patient participation: complete - patient participated  Level of consciousness: awake  Pain score: 3  Airway patency: patent  Nausea & Vomiting: no vomiting and no nausea  Complications: no  Cardiovascular status: blood pressure returned to baseline and hemodynamically stable  Respiratory status: acceptable  Hydration status: stable

## 2022-05-12 ENCOUNTER — OFFICE VISIT (OUTPATIENT)
Dept: ORTHOPEDIC SURGERY | Age: 52
End: 2022-05-12

## 2022-05-12 VITALS
RESPIRATION RATE: 16 BRPM | OXYGEN SATURATION: 98 % | TEMPERATURE: 97.2 F | WEIGHT: 191 LBS | HEART RATE: 87 BPM | HEIGHT: 67 IN | SYSTOLIC BLOOD PRESSURE: 118 MMHG | BODY MASS INDEX: 29.98 KG/M2 | DIASTOLIC BLOOD PRESSURE: 60 MMHG

## 2022-05-12 DIAGNOSIS — Z98.890 STATUS POST TRIGGER FINGER RELEASE: Primary | ICD-10-CM

## 2022-05-12 PROCEDURE — 99024 POSTOP FOLLOW-UP VISIT: CPT

## 2022-05-12 ASSESSMENT — ENCOUNTER SYMPTOMS
BACK PAIN: 0
FACIAL SWELLING: 0
SHORTNESS OF BREATH: 0
NAUSEA: 0
RHINORRHEA: 0
COUGH: 0

## 2022-05-12 NOTE — PROGRESS NOTES
Date of surgery:   May 2nd     History:  Ms. Alexus Johnson is here in follow up regarding her right TFR  She is doing rather well. She is having 3/10 pain. She denies chest pain, SOB, calf pain,fever,wound drainage. No other issues.

## 2022-05-12 NOTE — PROGRESS NOTES
5/12/2022   Chief Complaint   Patient presents with    Post-Op Check     right TFR        History of Present Illness:                             Falguni Lancaster is a 54 y.o. female returning for continued postoperative care regarding her right trigger finger release. Patient states she is doing great and had immediate relief of her clicking symptoms. Patient was able to tolerate well the suture removal performed in the office today. However, after the removal she does have some stinging sensations around the incision site. Patient denies any signs of infection, from the incision site such as drainage, fluctuance, surrounding erythema, or temperature changes. Patient able to reach near full extension of her ring finger and she maintains full flexion of her ring finger. 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 She would like to know what limitations she may have further continued care as far as getting her incision area wet during their bathing purposes or doing dishes and cleaning    MsZhao Lancaster is here in follow up regarding her right TFR  She is doing rather well. She is having 3/10 pain. She denies chest pain, SOB, calf pain,fever,wound drainage. No other issues. Medical History  Patient's medications, allergies, past medical, surgical, social and family histories were reviewed and updated as appropriate. Review of Systems   Constitutional: Negative for fever. HENT: Negative for facial swelling and rhinorrhea. Respiratory: Negative for cough and shortness of breath. Cardiovascular: Negative for chest pain. Gastrointestinal: Negative for nausea. Musculoskeletal: Positive for arthralgias. Negative for back pain, gait problem, joint swelling, myalgias, neck pain and neck stiffness. Skin: Negative for pallor and rash.    Neurological: Negative for facial asymmetry and speech difficulty. Psychiatric/Behavioral: Negative for agitation and confusion. Examination:  General Exam:  Vitals: /60   Pulse 87   Temp 97.2 °F (36.2 °C) (Temporal)   Resp 16   Ht 5' 7\" (1.702 m)   Wt 191 lb (86.6 kg)   SpO2 98%   BMI 29.91 kg/m²    Physical Exam  Constitutional:       General: She is not in acute distress. Appearance: Normal appearance. She is not ill-appearing. HENT:      Head: Normocephalic and atraumatic. Nose: No congestion or rhinorrhea. Eyes:      General: No scleral icterus. Right eye: No discharge. Left eye: No discharge. Cardiovascular:      Pulses: Normal pulses. Pulmonary:      Effort: Pulmonary effort is normal. No respiratory distress. Breath sounds: No stridor. Musculoskeletal:      Right wrist: Normal.      Right hand: Laceration and tenderness present. No swelling, deformity or bony tenderness. Decreased range of motion. Decreased strength. Normal sensation. Normal capillary refill. Normal pulse. Cervical back: Normal range of motion. Comments: Right hand exam: Patient incision appears well approximated without signs of infection such as erythema, fluctuance, drainage, or surrounding temperature changes. Patient has full active flexion of the ring finger as well as neighboring fingers and thumb. Patient has 5 degrees short of full extension of her right ring finger with full extension reached in passive stretching. Patient has some mild tenderness to palpation around the incision area and at the MCP joint of the right ring finger. Sensation to light touch intact throughout all surfaces of the wrist, hand, and fingers. Radial, ulnar, and median nerve motor function intact throughout the hand and fingers. Radial and ulnar pulses 2+, brisk capillary refill. Skin:     General: Skin is warm and dry.       Capillary Refill: Capillary refill takes less than 2 seconds. Coloration: Skin is not jaundiced or pale. Neurological:      General: No focal deficit present. Mental Status: She is alert and oriented to person, place, and time. Psychiatric:         Mood and Affect: Mood normal.         Behavior: Behavior normal.        Diagnostic testing:  X-rays reviewed in office, I independently reviewed the films in the office today:     No images taken at today's visit. Office Procedures:  No orders of the defined types were placed in this encounter. Assessment and Plan:  1. Status post trigger finger release, ring finger, right, DOS 5/2/2022  -Patient encouraged that she is doing very well and should continue to expect gains in the mobility and strength of her right ring finger. I explained to her that the tendons are designed to glide and that stretching can help prevent future stiffness in the finger. I explained to her that she should refrain from lifting heavy objects until her incision area is more fully healed. At this time there is no indication of infection around the incision area she can begin using gentle soap and water to keep the area clean. -Patient can utilize conservative management for swelling reduction and pain relief such as rest, ice, compression, elevation, and over-the-counter pain medication.  -Patient scheduled for follow-up in 6 weeks to assess the entirety of her healing process.         Electronically signed by Sanna Tijerina PA-C on 5/12/2022 at 12:00 PM

## 2022-07-12 ENCOUNTER — OFFICE VISIT (OUTPATIENT)
Dept: ORTHOPEDIC SURGERY | Age: 52
End: 2022-07-12

## 2022-07-12 VITALS
HEART RATE: 91 BPM | DIASTOLIC BLOOD PRESSURE: 95 MMHG | RESPIRATION RATE: 16 BRPM | BODY MASS INDEX: 31.34 KG/M2 | SYSTOLIC BLOOD PRESSURE: 148 MMHG | WEIGHT: 195 LBS | OXYGEN SATURATION: 97 % | HEIGHT: 66 IN

## 2022-07-12 DIAGNOSIS — M65.312 TRIGGER THUMB OF LEFT HAND: Primary | ICD-10-CM

## 2022-07-12 PROCEDURE — 99214 OFFICE O/P EST MOD 30 MIN: CPT | Performed by: ORTHOPAEDIC SURGERY

## 2022-07-12 PROCEDURE — G8417 CALC BMI ABV UP PARAM F/U: HCPCS | Performed by: ORTHOPAEDIC SURGERY

## 2022-07-12 PROCEDURE — G8427 DOCREV CUR MEDS BY ELIG CLIN: HCPCS | Performed by: ORTHOPAEDIC SURGERY

## 2022-07-12 PROCEDURE — 4004F PT TOBACCO SCREEN RCVD TLK: CPT | Performed by: ORTHOPAEDIC SURGERY

## 2022-07-12 PROCEDURE — 20550 NJX 1 TENDON SHEATH/LIGAMENT: CPT | Performed by: ORTHOPAEDIC SURGERY

## 2022-07-12 ASSESSMENT — ENCOUNTER SYMPTOMS
CHEST TIGHTNESS: 0
SHORTNESS OF BREATH: 0
COLOR CHANGE: 0
EYE PAIN: 0
EYE REDNESS: 0
VOMITING: 0
WHEEZING: 0

## 2022-07-12 NOTE — PATIENT INSTRUCTIONS
Continue weight-bearing as tolerated. Continue range of motion exercises as instructed. Ice and elevate as needed. Tylenol or Motrin for pain. Steroid injection given into the left thumb. Follow up in 6 weeks.
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
## 2022-08-23 ENCOUNTER — OFFICE VISIT (OUTPATIENT)
Dept: ORTHOPEDIC SURGERY | Age: 52
End: 2022-08-23
Payer: COMMERCIAL

## 2022-08-23 VITALS
WEIGHT: 219 LBS | BODY MASS INDEX: 35.2 KG/M2 | RESPIRATION RATE: 15 BRPM | SYSTOLIC BLOOD PRESSURE: 132 MMHG | HEIGHT: 66 IN | DIASTOLIC BLOOD PRESSURE: 84 MMHG

## 2022-08-23 DIAGNOSIS — M65.312 TRIGGER THUMB OF LEFT HAND: ICD-10-CM

## 2022-08-23 DIAGNOSIS — M65.341 ACQUIRED TRIGGER FINGER OF RIGHT RING FINGER: Primary | ICD-10-CM

## 2022-08-23 PROCEDURE — 4004F PT TOBACCO SCREEN RCVD TLK: CPT | Performed by: ORTHOPAEDIC SURGERY

## 2022-08-23 PROCEDURE — G8417 CALC BMI ABV UP PARAM F/U: HCPCS | Performed by: ORTHOPAEDIC SURGERY

## 2022-08-23 PROCEDURE — 99213 OFFICE O/P EST LOW 20 MIN: CPT | Performed by: ORTHOPAEDIC SURGERY

## 2022-08-23 PROCEDURE — G8427 DOCREV CUR MEDS BY ELIG CLIN: HCPCS | Performed by: ORTHOPAEDIC SURGERY

## 2022-08-23 ASSESSMENT — ENCOUNTER SYMPTOMS
CHEST TIGHTNESS: 0
SHORTNESS OF BREATH: 0
COLOR CHANGE: 0

## 2022-08-23 NOTE — PROGRESS NOTES
Patient returns to the office today for FU of the left thumb injection given on 7/21/22. Pt states the injection did help with her pain and locking of the finger.

## 2022-08-23 NOTE — PATIENT INSTRUCTIONS
Continue weight-bearing as tolerated. Continue range of motion exercises as instructed. Ice and elevate as needed. Tylenol or Motrin for pain.    Follow up as needed

## 2022-08-23 NOTE — PROGRESS NOTES
8/23/2022   Chief Complaint   Patient presents with    Trigger finger     Left thumb injection 7/21/22        History of Present Illness:                             Pipo Villa is a 54 y.o. female who returns today for follow-up of her left thumb and right ring finger. The injection performed at last visit helped a great deal with her pain and swelling. She has resumed normal function and range of motion of the thumb. No complaints today. Her right hand is doing well as well. Patient returns to the office today for FU of the left thumb injection given on 7/21/22. Pt states the injection did help with her pain and locking of the finger. Medical History  Patient's medications, allergies, past medical, surgical, social and family histories were reviewed and updated as appropriate. Review of Systems   Constitutional:  Negative for activity change and fever. Respiratory:  Negative for chest tightness and shortness of breath. Cardiovascular:  Negative for chest pain. Skin:  Negative for color change. Neurological:  Negative for dizziness. Psychiatric/Behavioral:  The patient is not nervous/anxious. Examination:  General Exam:  Vitals: /84   Resp 15   Ht 5' 6\" (1.676 m)   Wt 219 lb (99.3 kg)   BMI 35.35 kg/m²    Physical Exam     Right upper extremity:  Well-healed surgical scar over the ring finger. Full painless active range of motion present the ring finger with intact strength during flexion extension across the MP and IP joints. Minimal scar tissue with no tenderness. No soft tissue swelling. Left upper extremity:  No tenderness to palpation of the thumb. No soft tissue swelling. Full painless active range of motion present at the thumb. No mechanical catching or locking. Sensation motor functions intact throughout      Office Procedures:  No orders of the defined types were placed in this encounter.       Assessment and Plan  1. status post left trigger thumb injection, improved    2. Status post right ring finger trigger release    She has responded well to the injection of the left thumb. She has resumed normal activities and has no complaints of pain or stiffness or locking today. I have encouraged her to continue with stretching exercises. Follow-up as needed if symptoms return. Her right hand is healed well. No pain or swelling or locking today. Continue stretching exercises and scar tissue massage.   Follow-up as needed        Electronically signed by Francisco J Alcaraz MD on 8/23/2022 at 1:51 PM

## 2023-04-01 ENCOUNTER — HOSPITAL ENCOUNTER (EMERGENCY)
Age: 53
Discharge: HOME | End: 2023-04-01
Attending: EMERGENCY MEDICINE
Payer: COMMERCIAL

## 2023-04-01 VITALS
SYSTOLIC BLOOD PRESSURE: 164 MMHG | HEART RATE: 107 BPM | OXYGEN SATURATION: 96 % | BODY MASS INDEX: 31.39 KG/M2 | WEIGHT: 200 LBS | HEIGHT: 67 IN | RESPIRATION RATE: 20 BRPM | DIASTOLIC BLOOD PRESSURE: 86 MMHG | TEMPERATURE: 98.8 F

## 2023-04-01 DIAGNOSIS — J01.00 ACUTE MAXILLARY SINUSITIS, RECURRENCE NOT SPECIFIED: Primary | ICD-10-CM

## 2023-04-01 PROCEDURE — 99283 EMERGENCY DEPT VISIT LOW MDM: CPT

## 2023-04-01 PROCEDURE — 6370000000 HC RX 637 (ALT 250 FOR IP): Performed by: EMERGENCY MEDICINE

## 2023-04-01 RX ORDER — AMOXICILLIN AND CLAVULANATE POTASSIUM 875; 125 MG/1; MG/1
1 TABLET, FILM COATED ORAL 2 TIMES DAILY
Qty: 20 TABLET | Refills: 0 | Status: SHIPPED | OUTPATIENT
Start: 2023-04-01 | End: 2023-04-11

## 2023-04-01 RX ORDER — AMOXICILLIN AND CLAVULANATE POTASSIUM 875; 125 MG/1; MG/1
1 TABLET, FILM COATED ORAL ONCE
Status: COMPLETED | OUTPATIENT
Start: 2023-04-01 | End: 2023-04-01

## 2023-04-01 RX ADMIN — AMOXICILLIN AND CLAVULANATE POTASSIUM 1 TABLET: 875; 125 TABLET, FILM COATED ORAL at 06:27

## 2023-04-01 NOTE — ED PROVIDER NOTES
Emergency Department Encounter    Patient: Therese Sommer  MRN: [id number]  : 1970  Date of Evaluation: 2023  ED Provider:  Debra Torres MD    Triage Chief Complaint:   Nasal Congestion (For the past week)    [place]:  Therese Sommer is a 50 y.o. female that presents with complaint of nasal congestion and sinus pressure. She had cough and cold symptoms like her children all did for a couple of weeks. However over the last week she has continued having severe sinus pressure, and she was blowing her nose so much that she actually had cracking, was applying hydrogen peroxide at her right nare. It is now scabbed, it is very painful. No bleeding or drainage. No shortness of breath. No chest pain. No vomiting. ROS - see HPI, below listed is current ROS at time of my eval:  10 systems reviewed and negative except as above.      Past Medical History:   Diagnosis Date    Back pain     Ovarian cyst      Past Surgical History:   Procedure Laterality Date    FINGER TRIGGER RELEASE Right 2022    RIGHT RING FINGER TRIGGER RELEASE performed by Elmer Javed MD at [address] Right     two times - Scoped and ACL repaired     KNEE SURGERY Left     scoped    MEDICATION INJECTION Right 2022    RIGHT WRIST INJECTION performed by Elmer Javed MD at [address] [facility]       Family History   Problem Relation Age of Onset    No Known Problems Mother     No Known Problems Father      Social History     Socioeconomic History    Marital status:      Spouse name: Not on file    Number of children: Not on file    Years of education: Not on file    Highest education level: Not on file   Occupational History    Not on file   Tobacco Use    Smoking status: Every Day     Packs/day: 1.00     Types: Cigarettes    Smokeless tobacco: Never   Vaping Use    Vaping Use: Never used   Substance and Sexual Activity    Alcohol use: Yes     Comment: occasionally \"twice a month\" septum is not swollen, but turbinates are swollen. She does have tenderness to percussion over maxillary sinuses bilaterally. Neck:  Trachea midline. Extremity:  No swelling. Normal ROM     Heart:  Regular rate and rhythm  Perfusion:  intact  Respiratory:  Respirations nonlabored. Abdominal:  Soft. Non distended. Neurological:  Alert and oriented             Psychiatric:  Appropriate    I have reviewed and interpreted all of the currently available lab results from this visit (if applicable):  No results found for this visit on 04/01/23. Radiographs (if obtained):  Radiologist's Report Reviewed:  No results found. MDM:    CC/HPI Summary, DDx, ED Course, and Reassessment: 74-year-old female with history as above, presents with nasal congestion sinus pressure, does sound like she had a URI, sounds like most of the family was sick, I am concerned that she does have some swelling, but does not appear to have a nasal abscess at this time, no fluctuance, more just small amount of edema and erythema, I do suspect she likely has a sinusitis and we will plan for Augmentin. She is comfortable with this. We did discuss if not improving that she would need imaging and potentially ENT intervention and she was agreeable for discharge home with close follow-up within 24 hours for recheck. No difficulty breathing, oropharynx is clear. No lymphadenopathy, no fevers. Nontender over gums and roof of the mouth.   She will be discharged in stable condition, given opportunity to ask questions         History from : Patient    Limitations to history : None    Patient was given the following medications:  Medications   amoxicillin-clavulanate (AUGMENTIN) 875-125 MG per tablet 1 tablet (1 tablet Oral Given 4/1/23 2126)       Disposition Considerations (tests considered but not done, Shared Decision Making, Pt Expectation of Test or Tx.):   Appropriate for outpatient management      I am the Primary Clinician of

## 2023-04-28 ENCOUNTER — OFFICE VISIT (OUTPATIENT)
Dept: ORTHOPEDIC SURGERY | Age: 53
End: 2023-04-28
Payer: COMMERCIAL

## 2023-04-28 VITALS
OXYGEN SATURATION: 96 % | HEIGHT: 67 IN | HEART RATE: 91 BPM | SYSTOLIC BLOOD PRESSURE: 136 MMHG | DIASTOLIC BLOOD PRESSURE: 88 MMHG | BODY MASS INDEX: 31.71 KG/M2 | RESPIRATION RATE: 16 BRPM | WEIGHT: 202 LBS

## 2023-04-28 DIAGNOSIS — S60.211A CONTUSION OF RIGHT WRIST, INITIAL ENCOUNTER: Primary | ICD-10-CM

## 2023-04-28 PROCEDURE — 99213 OFFICE O/P EST LOW 20 MIN: CPT | Performed by: PHYSICIAN ASSISTANT

## 2023-04-28 PROCEDURE — G8417 CALC BMI ABV UP PARAM F/U: HCPCS | Performed by: PHYSICIAN ASSISTANT

## 2023-04-28 PROCEDURE — G8427 DOCREV CUR MEDS BY ELIG CLIN: HCPCS | Performed by: PHYSICIAN ASSISTANT

## 2023-04-28 PROCEDURE — 3017F COLORECTAL CA SCREEN DOC REV: CPT | Performed by: PHYSICIAN ASSISTANT

## 2023-04-28 PROCEDURE — 4004F PT TOBACCO SCREEN RCVD TLK: CPT | Performed by: PHYSICIAN ASSISTANT

## 2023-04-28 RX ORDER — PREDNISONE 20 MG/1
20 TABLET ORAL 2 TIMES DAILY
Qty: 14 TABLET | Refills: 0 | Status: SHIPPED | OUTPATIENT
Start: 2023-04-28 | End: 2023-05-05

## 2023-05-01 ASSESSMENT — ENCOUNTER SYMPTOMS
EYES NEGATIVE: 1
GASTROINTESTINAL NEGATIVE: 1
RESPIRATORY NEGATIVE: 1

## 2023-05-01 NOTE — PROGRESS NOTES
Review of Systems   Constitutional: Negative. HENT: Negative. Eyes: Negative. Respiratory: Negative. Cardiovascular: Negative. Gastrointestinal: Negative. Genitourinary: Negative. Musculoskeletal:  Positive for arthralgias and myalgias. Skin: Negative. Negative for rash and wound. Neurological: Negative. Psychiatric/Behavioral: Negative. HPI:  Ehsan Coley is a 50 y.o. female that presents to the office today with complaining of right wrist pain that started after she hit the wrist on the corner of a table. She states the pain is over the ulnar styloid. She has pain when she turns it a certain way or when she is folding laundry. She has not really tried a brace at this time. She previously has had a right wrist injection by Dr. Magda Mcnair. She rates her pain today at a 5/10. Worse with twisting the wrist to the side.     Past Medical History:   Diagnosis Date    Back pain     Ovarian cyst        Past Surgical History:   Procedure Laterality Date    FINGER TRIGGER RELEASE Right 5/2/2022    RIGHT RING FINGER TRIGGER RELEASE performed by Delia Beverly MD at [address] Right     two times - Scoped and ACL repaired     KNEE SURGERY Left     scoped    MEDICATION INJECTION Right 5/2/2022    RIGHT WRIST INJECTION performed by Delia Beverly MD at [facility]         Family History   Problem Relation Age of Onset    No Known Problems Mother     No Known Problems Father        Social History     Socioeconomic History    Marital status:      Spouse name: None    Number of children: None    Years of education: None    Highest education level: None   Tobacco Use    Smoking status: Every Day     Packs/day: 1.00     Types: Cigarettes    Smokeless tobacco: Never   Vaping Use    Vaping Use: Never used   Substance and Sexual Activity    Alcohol use: Yes     Comment: occasionally \"twice a month\"      Drug use: Yes     Types: Marijuana

## 2023-06-27 ENCOUNTER — OFFICE VISIT (OUTPATIENT)
Dept: ORTHOPEDIC SURGERY | Age: 53
End: 2023-06-27

## 2023-06-27 VITALS — OXYGEN SATURATION: 98 % | HEART RATE: 77 BPM | RESPIRATION RATE: 16 BRPM | TEMPERATURE: 97.6 F

## 2023-06-27 DIAGNOSIS — M65.311 TRIGGER THUMB OF RIGHT HAND: ICD-10-CM

## 2023-06-27 DIAGNOSIS — M77.8 RIGHT WRIST TENDINITIS: Primary | ICD-10-CM

## 2023-07-03 ASSESSMENT — ENCOUNTER SYMPTOMS
SHORTNESS OF BREATH: 0
COLOR CHANGE: 0
CHEST TIGHTNESS: 0

## 2023-07-14 ENCOUNTER — APPOINTMENT (OUTPATIENT)
Dept: GENERAL RADIOLOGY | Age: 53
End: 2023-07-14
Payer: COMMERCIAL

## 2023-07-14 ENCOUNTER — HOSPITAL ENCOUNTER (EMERGENCY)
Age: 53
Discharge: HOME | End: 2023-07-14
Attending: EMERGENCY MEDICINE
Payer: COMMERCIAL

## 2023-07-14 VITALS
BODY MASS INDEX: 31.34 KG/M2 | WEIGHT: 195 LBS | DIASTOLIC BLOOD PRESSURE: 65 MMHG | HEART RATE: 72 BPM | HEIGHT: 66 IN | RESPIRATION RATE: 17 BRPM | SYSTOLIC BLOOD PRESSURE: 111 MMHG | TEMPERATURE: 97.6 F | OXYGEN SATURATION: 99 %

## 2023-07-14 DIAGNOSIS — S92.514A CLOSED NONDISPLACED FRACTURE OF PROXIMAL PHALANX OF LESSER TOE OF RIGHT FOOT, INITIAL ENCOUNTER: Primary | ICD-10-CM

## 2023-07-14 PROCEDURE — 73630 X-RAY EXAM OF FOOT: CPT

## 2023-07-14 PROCEDURE — 99283 EMERGENCY DEPT VISIT LOW MDM: CPT

## 2023-07-14 PROCEDURE — 6370000000 HC RX 637 (ALT 250 FOR IP): Performed by: EMERGENCY MEDICINE

## 2023-07-14 RX ORDER — IBUPROFEN 600 MG/1
600 TABLET ORAL EVERY 8 HOURS PRN
Qty: 30 TABLET | Refills: 0 | Status: SHIPPED | OUTPATIENT
Start: 2023-07-14

## 2023-07-14 RX ORDER — ACETAMINOPHEN 500 MG
1000 TABLET ORAL ONCE
Status: COMPLETED | OUTPATIENT
Start: 2023-07-14 | End: 2023-07-14

## 2023-07-14 RX ORDER — IBUPROFEN 400 MG/1
800 TABLET ORAL ONCE
Status: COMPLETED | OUTPATIENT
Start: 2023-07-14 | End: 2023-07-14

## 2023-07-14 RX ADMIN — ACETAMINOPHEN 1000 MG: 500 TABLET ORAL at 04:16

## 2023-07-14 RX ADMIN — IBUPROFEN 800 MG: 400 TABLET, FILM COATED ORAL at 04:16

## 2023-07-14 ASSESSMENT — PAIN SCALES - GENERAL: PAINLEVEL_OUTOF10: 6

## 2023-07-14 ASSESSMENT — PAIN DESCRIPTION - LOCATION: LOCATION: TOE (COMMENT WHICH ONE)

## 2023-07-14 NOTE — ED PROVIDER NOTES
CHIEF COMPLAINT    Chief Complaint   Patient presents with    Toe Pain     HPI  Jhon Sidhu is a 50 y.o. female who presents to the ED with      REVIEW OF SYSTEMS  Constitutional: No fever, chills or recent illness. Eye: No visual changes  HENT: No earache or sore throat. Resp: No SOB or productive cough. Cardio: No chest pain or palpitations. GI: No abdominal pain, nausea, vomiting, constipation or diarrhea. No melena. : No dysuria, urgency or frequency. Endocrine: No heat intolerance, no cold intolerance, no polydipsia   Lymphatics: No adenopathy  Musculoskeletal: No new muscle aches or joint pain. Neuro: No headaches. Psych: No homicidal or suicidal thoughts  Skin: No rash, No itching. ?  ?   PAST MEDICAL HISTORY  Past Medical History:   Diagnosis Date    Back pain     Ovarian cyst      FAMILY HISTORY  Family History   Problem Relation Age of Onset    No Known Problems Mother     No Known Problems Father      SOCIAL HISTORY  Social History     Socioeconomic History    Marital status:    Tobacco Use    Smoking status: Every Day     Packs/day: 1.00     Types: Cigarettes    Smokeless tobacco: Never   Vaping Use    Vaping Use: Never used   Substance and Sexual Activity    Alcohol use: Yes     Comment: occasionally \"twice a month\"      Drug use: Yes     Types: Marijuana [employer])     Comment: medical marijuana - last used 1 week  prior        SURGICAL HISTORY  Past Surgical History:   Procedure Laterality Date    FINGER TRIGGER RELEASE Right 5/2/2022    RIGHT RING FINGER TRIGGER RELEASE performed by Isaias Melvni MD at [address] Right     two times - Scoped and ACL repaired     KNEE SURGERY Left     scoped    MEDICATION INJECTION Right 5/2/2022    RIGHT WRIST INJECTION performed by Isaias Melvin MD at [facility],[address]  Previous Medications    ACETAMINOPHEN (TYLENOL) 500 MG TABLET    Take 1 tablet by mouth as needed for Pain

## 2023-10-12 ENCOUNTER — OFFICE VISIT (OUTPATIENT)
Dept: ORTHOPEDIC SURGERY | Age: 53
End: 2023-10-12
Payer: COMMERCIAL

## 2023-10-12 VITALS
OXYGEN SATURATION: 97 % | HEIGHT: 66 IN | WEIGHT: 190 LBS | BODY MASS INDEX: 30.53 KG/M2 | HEART RATE: 108 BPM | RESPIRATION RATE: 14 BRPM

## 2023-10-12 DIAGNOSIS — S92.515A NONDISPLACED FRACTURE OF PROXIMAL PHALANX OF LEFT LESSER TOE(S), INITIAL ENCOUNTER FOR CLOSED FRACTURE: Primary | ICD-10-CM

## 2023-10-12 PROCEDURE — G8417 CALC BMI ABV UP PARAM F/U: HCPCS

## 2023-10-12 PROCEDURE — 99213 OFFICE O/P EST LOW 20 MIN: CPT

## 2023-10-12 PROCEDURE — G8427 DOCREV CUR MEDS BY ELIG CLIN: HCPCS

## 2023-10-12 PROCEDURE — 3017F COLORECTAL CA SCREEN DOC REV: CPT

## 2023-10-12 PROCEDURE — G8484 FLU IMMUNIZE NO ADMIN: HCPCS

## 2023-10-12 PROCEDURE — 4004F PT TOBACCO SCREEN RCVD TLK: CPT

## 2023-10-12 RX ORDER — HYDROCODONE BITARTRATE AND ACETAMINOPHEN 5; 325 MG/1; MG/1
1 TABLET ORAL EVERY 6 HOURS PRN
Qty: 28 TABLET | Refills: 0 | Status: SHIPPED | OUTPATIENT
Start: 2023-10-12 | End: 2023-10-19

## 2023-10-12 ASSESSMENT — ENCOUNTER SYMPTOMS
NAUSEA: 0
BACK PAIN: 0
FACIAL SWELLING: 0
COUGH: 0
SHORTNESS OF BREATH: 0
RHINORRHEA: 0

## 2023-10-12 NOTE — PATIENT INSTRUCTIONS
Continue wearing the post op shoe. Recommend walking with the heel first.  Follow up in 4 weeks. OTC medications as needed. Ice and elevate for pain.

## 2023-10-12 NOTE — PROGRESS NOTES
10/12/2023   Chief Complaint   Patient presents with    Foot Injury     Left         History of Present Illness:                             Scott Terry is a 50 y.o. female returns office today for a new issue of a left foot injury. Patient states that her left foot was jammed on a cabinet door on accident and she had immediate pain and bruising forming around the base of the fourth left toe. She states she has broken her right foot before and had a postoperative shoe. She went to the emergency room and they instructed her to follow-up in our office. She denies any distal paresthesias or temperature changes into the toe or foot. She notes ecchymosis spreading throughout the forefoot. Patient presents to the office with a new complaint of left foot pain. Pt stated the pain started on Monday when she got up early in the morning and stubbed her toe on the walking to the bathroom. Pt has swelling and bruising at the site of her 4th metatarsal. Pt stated that her pain is 8/10 constantly and the tylenol is  not helping. Pt has been wearing a post op shoe she was given in the past.   Medical History  Patient's medications, allergies, past medical, surgical, social and family histories were reviewed and updated as appropriate. Review of Systems   Constitutional:  Negative for fever. HENT:  Negative for facial swelling and rhinorrhea. Respiratory:  Negative for cough and shortness of breath. Cardiovascular:  Negative for chest pain. Gastrointestinal:  Negative for nausea. Musculoskeletal:  Positive for arthralgias. Negative for back pain, gait problem, joint swelling, myalgias, neck pain and neck stiffness. Skin:  Negative for pallor and rash. Neurological:  Negative for facial asymmetry and speech difficulty. Psychiatric/Behavioral:  Negative for agitation and confusion.                                                 Examination:  General Exam:  Vitals: Pulse (!) 108   Resp 14

## 2023-10-12 NOTE — PROGRESS NOTES
Patient presents to the office with a new complaint of left foot pain. Pt stated the pain started on Monday when she got up early in the morning and stubbed her toe on the walking to the bathroom. Pt has swelling and bruising at the site of her 4th metatarsal. Pt stated that her pain is 8/10 constantly and the tylenol is  not helping.  Pt has been wearing a post op shoe she was given in the past.

## 2024-03-26 NOTE — PROGRESS NOTES
Patient seen in office today for:  Left hand & wrist pain, with tingling and numbness. Started after burning herself while making candy.     Patient reports 5/10 pain.  RICE and medication are not effective to alleviate pain and reduce swelling.     Pain worsened by: Patient reports painful ROM & weight bearing.     Xrays performed in office today.     Right handed

## 2024-03-26 NOTE — PATIENT INSTRUCTIONS
Continue weight-bearing as tolerated.  Continue range of motion exercises as instructed.  Ice and elevate as needed.  Tylenol or Motrin for pain.  Stretch hand and wrist  Follow up as needed

## 2024-03-27 ENCOUNTER — OFFICE VISIT (OUTPATIENT)
Dept: ORTHOPEDIC SURGERY | Age: 54
End: 2024-03-27
Payer: COMMERCIAL

## 2024-03-27 VITALS — OXYGEN SATURATION: 98 % | HEART RATE: 74 BPM | TEMPERATURE: 98.9 F | RESPIRATION RATE: 17 BRPM

## 2024-03-27 DIAGNOSIS — S69.92XA HAND INJURY, LEFT, INITIAL ENCOUNTER: Primary | ICD-10-CM

## 2024-03-27 PROCEDURE — G8427 DOCREV CUR MEDS BY ELIG CLIN: HCPCS

## 2024-03-27 PROCEDURE — G8484 FLU IMMUNIZE NO ADMIN: HCPCS

## 2024-03-27 PROCEDURE — 3017F COLORECTAL CA SCREEN DOC REV: CPT

## 2024-03-27 PROCEDURE — 4004F PT TOBACCO SCREEN RCVD TLK: CPT

## 2024-03-27 PROCEDURE — 99213 OFFICE O/P EST LOW 20 MIN: CPT

## 2024-03-27 PROCEDURE — G8417 CALC BMI ABV UP PARAM F/U: HCPCS

## 2024-04-10 ASSESSMENT — ENCOUNTER SYMPTOMS
COUGH: 0
RHINORRHEA: 0
NAUSEA: 0
BACK PAIN: 0
SHORTNESS OF BREATH: 0
FACIAL SWELLING: 0

## 2024-04-10 NOTE — PROGRESS NOTES
3/27/2024   Chief Complaint   Patient presents with    Consultation     Left hand & wrist pain         History of Present Illness:                             Ludy Huber is a 53 y.o. female presenting to the office today for a new issue of left hand and wrist pain.  Patient states that she accidentally burned herself while making candy recently and she has since had some numbness and tingling in the left hand.  She states the burn has healed but the lingering paresthesias are making it difficult to be confident and gripping objects.  She denies any previous symptoms before the incident.    Patient seen in office today for:  Left hand & wrist pain, with tingling and numbness. Started after burning herself while making candy.      Patient reports 5/10 pain.  RICE and medication are not effective to alleviate pain and reduce swelling.      Pain worsened by: Patient reports painful ROM & weight bearing.      Xrays performed in office today.      Right handed       Medical History  Patient's medications, allergies, past medical, surgical, social and family histories were reviewed and updated as appropriate.      Review of Systems   Constitutional:  Negative for fever.   HENT:  Negative for facial swelling and rhinorrhea.    Respiratory:  Negative for cough and shortness of breath.    Cardiovascular:  Negative for chest pain.   Gastrointestinal:  Negative for nausea.   Musculoskeletal:  Positive for arthralgias. Negative for back pain, gait problem, joint swelling, myalgias, neck pain and neck stiffness.   Skin:  Negative for pallor and rash.   Neurological:  Positive for numbness. Negative for facial asymmetry and speech difficulty.   Psychiatric/Behavioral:  Negative for agitation and confusion.                                                Examination:  General Exam:  Vitals: Pulse 74   Temp 98.9 °F (37.2 °C)   Resp 17   SpO2 98%    Physical Exam  Constitutional:       General: She is not in acute

## 2024-07-11 ENCOUNTER — OFFICE VISIT (OUTPATIENT)
Dept: ORTHOPEDIC SURGERY | Age: 54
End: 2024-07-11
Payer: COMMERCIAL

## 2024-07-11 VITALS — OXYGEN SATURATION: 100 % | HEART RATE: 99 BPM | TEMPERATURE: 97.6 F

## 2024-07-11 DIAGNOSIS — M79.642 LEFT HAND PAIN: Primary | ICD-10-CM

## 2024-07-11 PROCEDURE — G8417 CALC BMI ABV UP PARAM F/U: HCPCS

## 2024-07-11 PROCEDURE — 4004F PT TOBACCO SCREEN RCVD TLK: CPT

## 2024-07-11 PROCEDURE — 3017F COLORECTAL CA SCREEN DOC REV: CPT

## 2024-07-11 PROCEDURE — 99213 OFFICE O/P EST LOW 20 MIN: CPT

## 2024-07-11 PROCEDURE — G8427 DOCREV CUR MEDS BY ELIG CLIN: HCPCS

## 2024-07-11 ASSESSMENT — ENCOUNTER SYMPTOMS
RHINORRHEA: 0
NAUSEA: 0
FACIAL SWELLING: 0
BACK PAIN: 0
SHORTNESS OF BREATH: 0
COUGH: 0
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
## 2024-07-11 NOTE — PROGRESS NOTES
FU left hand pain/ fingers      Patient states that she accidentally burned herself while making candy recently and she has since had some numbness and tingling in the left hand. She states the burn has healed but the lingering paresthesias are making it difficult to be confident and gripping objects   Cut her wrist on window about a week or right along the bone on her left wrist now swollen and very bad pain she did go to hospital had xray because it just started hurting really bad she is upset with [facility] how they treated her.. they did give her a brace and put her on an antibiotic    10/10 pain     Pt left abruptly during discharge process unable to explain MRI process or  advise a follow up appointment . Pt called and I explained STAT MRI process and let her know her next follow up appt.

## 2024-07-11 NOTE — PATIENT INSTRUCTIONS
Stat MRI of her Left forearm , wrist and hand   Looking for an infection secondary to a puncture wound   Follow up on Tuesday   Remain on antibiotics that was given to her from the ED   Central scheduling [phone] will be calling you to schedule your MRI.  If you do not hear from them with in a week give them a call.

## 2024-07-14 ENCOUNTER — HOSPITAL ENCOUNTER (OUTPATIENT)
Dept: MRI IMAGING | Age: 54
Discharge: HOME | End: 2024-07-14

## 2024-07-14 DIAGNOSIS — M79.642 LEFT HAND PAIN: ICD-10-CM

## 2024-07-14 NOTE — PROGRESS NOTES
Pt here for MRI of the elbow - however we had an exam that took longer than expected to finish. Before pt could be seen, she left at 10:35 - complaining about the amount of time she had to wait.

## 2024-11-27 ENCOUNTER — HOSPITAL ENCOUNTER (EMERGENCY)
Age: 54
Discharge: ANOTHER HOSPITAL | End: 2024-11-27
Attending: EMERGENCY MEDICINE
Payer: OTHER MISCELLANEOUS

## 2024-11-27 ENCOUNTER — APPOINTMENT (OUTPATIENT)
Dept: GENERAL RADIOLOGY | Age: 54
End: 2024-11-27
Payer: OTHER MISCELLANEOUS

## 2024-11-27 ENCOUNTER — APPOINTMENT (OUTPATIENT)
Dept: CT IMAGING | Age: 54
End: 2024-11-27
Payer: OTHER MISCELLANEOUS

## 2024-11-27 VITALS
SYSTOLIC BLOOD PRESSURE: 137 MMHG | OXYGEN SATURATION: 98 % | TEMPERATURE: 98.3 F | HEART RATE: 94 BPM | DIASTOLIC BLOOD PRESSURE: 83 MMHG | BODY MASS INDEX: 30.67 KG/M2 | WEIGHT: 190 LBS | RESPIRATION RATE: 11 BRPM

## 2024-11-27 DIAGNOSIS — F32.A DEPRESSION WITH SUICIDAL IDEATION: ICD-10-CM

## 2024-11-27 DIAGNOSIS — S72.001A CLOSED FRACTURE OF RIGHT HIP, INITIAL ENCOUNTER (HCC): ICD-10-CM

## 2024-11-27 DIAGNOSIS — S73.004A DISLOCATION OF RIGHT HIP, INITIAL ENCOUNTER (HCC): Primary | ICD-10-CM

## 2024-11-27 DIAGNOSIS — R45.851 DEPRESSION WITH SUICIDAL IDEATION: ICD-10-CM
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
## 2024-11-27 LAB
ALBUMIN SERPL-MCNC: 4.4 G/DL (ref 3.4–5)
ALBUMIN/GLOB SERPL: 1.9 {RATIO} (ref 1.1–2.2)
ALP SERPL-CCNC: 96 U/L (ref 40–129)
ALT SERPL-CCNC: 19 U/L (ref 10–40)
ANION GAP SERPL CALCULATED.3IONS-SCNC: 14 MMOL/L (ref 9–17)
APAP SERPL-MCNC: <5 UG/ML (ref 10–30)
AST SERPL-CCNC: 24 U/L (ref 15–37)
B-HCG SERPL EIA 3RD IS-ACNC: 1.7 MIU/ML
BASOPHILS # BLD: 0.03 K/UL
BASOPHILS NFR BLD: 0 % (ref 0–1)
BILIRUB SERPL-MCNC: 0.2 MG/DL (ref 0–1)
BUN SERPL-MCNC: 8 MG/DL (ref 7–20)
CALCIUM SERPL-MCNC: 9.5 MG/DL (ref 8.3–10.6)
CHLORIDE SERPL-SCNC: 105 MMOL/L (ref 99–110)
CO2 SERPL-SCNC: 24 MMOL/L (ref 21–32)
CREAT SERPL-MCNC: 0.6 MG/DL (ref 0.6–1.1)
EOSINOPHIL # BLD: 0.18 K/UL
EOSINOPHILS RELATIVE PERCENT: 3 % (ref 0–3)
ERYTHROCYTE [DISTWIDTH] IN BLOOD BY AUTOMATED COUNT: 14.3 % (ref 11.7–14.9)
ETHANOLAMINE SERPL-MCNC: 170 MG/DL (ref 0–0.08)
GFR, ESTIMATED: >90 ML/MIN/1.73M2
GLUCOSE SERPL-MCNC: 109 MG/DL (ref 74–99)
HCT VFR BLD AUTO: 46.7 % (ref 37–47)
HGB BLD-MCNC: 15.4 G/DL (ref 12.5–16)
IMM GRANULOCYTES # BLD AUTO: 0.03 K/UL
IMM GRANULOCYTES NFR BLD: 0 %
LYMPHOCYTES NFR BLD: 2.82 K/UL
LYMPHOCYTES RELATIVE PERCENT: 41 % (ref 24–44)
MCH RBC QN AUTO: 30.2 PG (ref 27–31)
MCHC RBC AUTO-ENTMCNC: 33 G/DL (ref 32–36)
MCV RBC AUTO: 91.6 FL (ref 78–100)
MONOCYTES NFR BLD: 0.41 K/UL
MONOCYTES NFR BLD: 6 % (ref 0–4)
NEUTROPHILS NFR BLD: 50 % (ref 36–66)
NEUTS SEG NFR BLD: 3.48 K/UL
PLATELET # BLD AUTO: 298 K/UL (ref 140–440)
PMV BLD AUTO: 9.8 FL (ref 7.5–11.1)
POTASSIUM SERPL-SCNC: 4.2 MMOL/L (ref 3.5–5.1)
PROT SERPL-MCNC: 6.7 G/DL (ref 6.4–8.2)
RBC # BLD AUTO: 5.1 M/UL (ref 4.2–5.4)
SALICYLATES SERPL-MCNC: <0.5 MG/DL (ref 15–30)
SODIUM SERPL-SCNC: 143 MMOL/L (ref 136–145)
TSH SERPL DL<=0.05 MIU/L-ACNC: 2.56 UIU/ML (ref 0.27–4.2)
WBC OTHER # BLD: 7 K/UL (ref 4–10.5)

## 2024-11-27 PROCEDURE — 73501 X-RAY EXAM HIP UNI 1 VIEW: CPT

## 2024-11-27 PROCEDURE — 70450 CT HEAD/BRAIN W/O DYE: CPT

## 2024-11-27 PROCEDURE — 71045 X-RAY EXAM CHEST 1 VIEW: CPT

## 2024-11-27 PROCEDURE — 6360000002 HC RX W HCPCS: Performed by: EMERGENCY MEDICINE

## 2024-11-27 PROCEDURE — 6360000004 HC RX CONTRAST MEDICATION: Performed by: EMERGENCY MEDICINE

## 2024-11-27 PROCEDURE — 80179 DRUG ASSAY SALICYLATE: CPT

## 2024-11-27 PROCEDURE — G0480 DRUG TEST DEF 1-7 CLASSES: HCPCS

## 2024-11-27 PROCEDURE — 85025 COMPLETE CBC W/AUTO DIFF WBC: CPT

## 2024-11-27 PROCEDURE — 71275 CT ANGIOGRAPHY CHEST: CPT

## 2024-11-27 PROCEDURE — 99285 EMERGENCY DEPT VISIT HI MDM: CPT

## 2024-11-27 PROCEDURE — 80053 COMPREHEN METABOLIC PANEL: CPT

## 2024-11-27 PROCEDURE — 80143 DRUG ASSAY ACETAMINOPHEN: CPT

## 2024-11-27 PROCEDURE — 72125 CT NECK SPINE W/O DYE: CPT

## 2024-11-27 PROCEDURE — 96375 TX/PRO/DX INJ NEW DRUG ADDON: CPT

## 2024-11-27 PROCEDURE — 96374 THER/PROPH/DIAG INJ IV PUSH: CPT

## 2024-11-27 PROCEDURE — 84443 ASSAY THYROID STIM HORMONE: CPT

## 2024-11-27 PROCEDURE — 84702 CHORIONIC GONADOTROPIN TEST: CPT

## 2024-11-27 RX ORDER — 0.9 % SODIUM CHLORIDE 0.9 %
1000 INTRAVENOUS SOLUTION INTRAVENOUS ONCE
Status: DISCONTINUED | OUTPATIENT
Start: 2024-11-27 | End: 2024-11-27 | Stop reason: HOSPADM

## 2024-11-27 RX ORDER — ONDANSETRON 2 MG/ML
4 INJECTION INTRAMUSCULAR; INTRAVENOUS EVERY 30 MIN PRN
Status: DISCONTINUED | OUTPATIENT
Start: 2024-11-27 | End: 2024-11-27 | Stop reason: HOSPADM

## 2024-11-27 RX ORDER — HYDROMORPHONE HYDROCHLORIDE 1 MG/ML
1 INJECTION, SOLUTION INTRAMUSCULAR; INTRAVENOUS; SUBCUTANEOUS EVERY 30 MIN PRN
Status: DISCONTINUED | OUTPATIENT
Start: 2024-11-27 | End: 2024-11-27 | Stop reason: HOSPADM

## 2024-11-27 RX ORDER — IOPAMIDOL 755 MG/ML
80 INJECTION, SOLUTION INTRAVASCULAR
Status: COMPLETED | OUTPATIENT
Start: 2024-11-27 | End: 2024-11-27

## 2024-11-27 RX ORDER — MORPHINE SULFATE 4 MG/ML
4 INJECTION, SOLUTION INTRAMUSCULAR; INTRAVENOUS EVERY 30 MIN PRN
Status: DISCONTINUED | OUTPATIENT
Start: 2024-11-27 | End: 2024-11-27 | Stop reason: HOSPADM

## 2024-11-27 RX ORDER — PROPOFOL 10 MG/ML
200 INJECTION, EMULSION INTRAVENOUS ONCE
Status: DISCONTINUED | OUTPATIENT
Start: 2024-11-27 | End: 2024-11-27 | Stop reason: HOSPADM

## 2024-11-27 RX ADMIN — IOPAMIDOL 80 ML: 755 INJECTION, SOLUTION INTRAVENOUS at 15:16

## 2024-11-27 RX ADMIN — ONDANSETRON 4 MG: 2 INJECTION INTRAMUSCULAR; INTRAVENOUS at 15:39

## 2024-11-27 RX ADMIN — MORPHINE SULFATE 4 MG: 4 INJECTION, SOLUTION INTRAMUSCULAR; INTRAVENOUS at 15:37

## 2024-11-27 ASSESSMENT — PAIN SCALES - GENERAL: PAINLEVEL_OUTOF10: 10

## 2024-11-27 ASSESSMENT — ENCOUNTER SYMPTOMS
ALLERGIC/IMMUNOLOGIC NEGATIVE: 1
GASTROINTESTINAL NEGATIVE: 1
EYES NEGATIVE: 1
RESPIRATORY NEGATIVE: 1

## 2024-11-27 ASSESSMENT — PAIN DESCRIPTION - LOCATION: LOCATION: HIP

## 2024-11-27 ASSESSMENT — PAIN DESCRIPTION - ORIENTATION: ORIENTATION: RIGHT

## 2024-11-27 NOTE — ED NOTES
Patient screaming and yelling. Pulled off C-collar.. SPD working with CPS to get children taken care of.

## 2024-11-27 NOTE — ED NOTES
Dr. Berkowitz in room explaining conscious sedation to patient. Patient states \" I just want to go to sleep and not wake up, just kill me now\" pt continues to make suicidal comments to Dr. Berkowitz. 1:1 sitter placed with patient.
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
## 2024-11-27 NOTE — ED NOTES
CPS here speaking with patient. Patients children are being released to patients brother at this time per approval from CPS.

## 2024-11-27 NOTE — ED NOTES
Called patients mother per request and consent [phone], mother unable to come to ED as she lives 5 hours away. Pt asking mother to find help with children being seen in Ed who are non verbal autistic

## 2024-11-27 NOTE — ED NOTES
SPD at bedside questioning patient at this time. Patient reports to officer that she has had \"6 shots of tequila several hours ago\" but is unsure how long ago.
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
## 2024-11-27 NOTE — ED NOTES
Patient attempting to pull off c collar- patient states she cannot breath. Patient admits to drinking earlier this morning.
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
## 2024-11-27 NOTE — ED NOTES
SPD at bedside at this time to obtain blood specimen. Pt states that she had several shots of tequila earlier this afternoon around 11 am. Pt agreeing to the blood draw for SPD at this time. This RN collected pt's blood sample for SPD.

## 2024-12-04 ENCOUNTER — HOSPITAL ENCOUNTER (EMERGENCY)
Age: 54
Discharge: HOME | End: 2024-12-04
Payer: COMMERCIAL

## 2024-12-04 ENCOUNTER — APPOINTMENT (OUTPATIENT)
Dept: GENERAL RADIOLOGY | Age: 54
End: 2024-12-04
Payer: COMMERCIAL

## 2024-12-04 VITALS
HEART RATE: 82 BPM | RESPIRATION RATE: 16 BRPM | SYSTOLIC BLOOD PRESSURE: 137 MMHG | OXYGEN SATURATION: 97 % | DIASTOLIC BLOOD PRESSURE: 71 MMHG | TEMPERATURE: 98.9 F

## 2024-12-04 DIAGNOSIS — M25.551 RIGHT HIP PAIN: Primary | ICD-10-CM

## 2024-12-04 DIAGNOSIS — Z98.890 HISTORY OF OPEN REDUCTION AND INTERNAL FIXATION (ORIF) PROCEDURE: ICD-10-CM

## 2024-12-04 PROCEDURE — 99285 EMERGENCY DEPT VISIT HI MDM: CPT

## 2024-12-04 PROCEDURE — 73502 X-RAY EXAM HIP UNI 2-3 VIEWS: CPT

## 2024-12-04 ASSESSMENT — PAIN - FUNCTIONAL ASSESSMENT
PAIN_FUNCTIONAL_ASSESSMENT: 0-10
PAIN_FUNCTIONAL_ASSESSMENT: 0-10

## 2024-12-04 ASSESSMENT — PAIN DESCRIPTION - ORIENTATION: ORIENTATION: RIGHT;LEFT

## 2024-12-04 ASSESSMENT — PAIN DESCRIPTION - LOCATION: LOCATION: HIP;KNEE;HAND

## 2024-12-04 ASSESSMENT — PAIN SCALES - GENERAL
PAINLEVEL_OUTOF10: 7
PAINLEVEL_OUTOF10: 8
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
## 2025-01-09 NOTE — ED NOTES
2nd blood draw obtained on pt for SPD. Skin prepped and cleaned with betadine prior to obtaining specimen. SPD at bedside during this time. Pt consents to blood draw for SPD.    1

## 2025-02-21 ENCOUNTER — HOSPITAL ENCOUNTER (OUTPATIENT)
Dept: PHYSICAL THERAPY | Age: 55
Setting detail: THERAPIES SERIES
Discharge: HOME | End: 2025-02-21

## 2025-04-30 ENCOUNTER — APPOINTMENT (OUTPATIENT)
Dept: GENERAL RADIOLOGY | Age: 55
End: 2025-04-30
Payer: COMMERCIAL

## 2025-04-30 ENCOUNTER — HOSPITAL ENCOUNTER (EMERGENCY)
Age: 55
Discharge: HOME | End: 2025-04-30
Payer: COMMERCIAL

## 2025-04-30 VITALS
SYSTOLIC BLOOD PRESSURE: 141 MMHG | DIASTOLIC BLOOD PRESSURE: 72 MMHG | RESPIRATION RATE: 16 BRPM | HEART RATE: 82 BPM | TEMPERATURE: 98.7 F | HEIGHT: 66 IN | BODY MASS INDEX: 32.14 KG/M2 | OXYGEN SATURATION: 100 % | WEIGHT: 200 LBS

## 2025-04-30 DIAGNOSIS — M25.551 RIGHT HIP PAIN: ICD-10-CM

## 2025-04-30 DIAGNOSIS — M54.41 ACUTE RIGHT-SIDED LOW BACK PAIN WITH RIGHT-SIDED SCIATICA: Primary | ICD-10-CM

## 2025-04-30 PROCEDURE — 99284 EMERGENCY DEPT VISIT MOD MDM: CPT

## 2025-04-30 PROCEDURE — 96372 THER/PROPH/DIAG INJ SC/IM: CPT

## 2025-04-30 PROCEDURE — 99283 EMERGENCY DEPT VISIT LOW MDM: CPT

## 2025-04-30 PROCEDURE — 6370000000 HC RX 637 (ALT 250 FOR IP): Performed by: NURSE PRACTITIONER

## 2025-04-30 PROCEDURE — 73502 X-RAY EXAM HIP UNI 2-3 VIEWS: CPT

## 2025-04-30 PROCEDURE — 6360000002 HC RX W HCPCS: Performed by: NURSE PRACTITIONER

## 2025-04-30 RX ORDER — KETOROLAC TROMETHAMINE 15 MG/ML
30 INJECTION, SOLUTION INTRAMUSCULAR; INTRAVENOUS ONCE
Status: COMPLETED | OUTPATIENT
Start: 2025-04-30 | End: 2025-04-30

## 2025-04-30 RX ORDER — NAPROXEN 500 MG/1
500 TABLET ORAL 2 TIMES DAILY WITH MEALS
Qty: 28 TABLET | Refills: 0 | Status: SHIPPED | OUTPATIENT
Start: 2025-04-30 | End: 2025-05-14

## 2025-04-30 RX ORDER — DEXAMETHASONE SODIUM PHOSPHATE 10 MG/ML
10 INJECTION, SOLUTION INTRAMUSCULAR; INTRAVENOUS ONCE
Status: COMPLETED | OUTPATIENT
Start: 2025-04-30 | End: 2025-04-30

## 2025-04-30 RX ORDER — LIDOCAINE 4 G/G
1 PATCH TOPICAL DAILY
Qty: 30 PATCH | Refills: 0 | Status: SHIPPED | OUTPATIENT
Start: 2025-04-30 | End: 2025-05-30

## 2025-04-30 RX ORDER — PREDNISONE 20 MG/1
40 TABLET ORAL DAILY
Qty: 10 TABLET | Refills: 0 | Status: SHIPPED | OUTPATIENT
Start: 2025-04-30 | End: 2025-05-05

## 2025-04-30 RX ADMIN — DEXAMETHASONE SODIUM PHOSPHATE 10 MG: 10 INJECTION, SOLUTION INTRAMUSCULAR; INTRAVENOUS at 09:30

## 2025-04-30 RX ADMIN — TIZANIDINE 4 MG: 4 TABLET ORAL at 09:31

## 2025-04-30 RX ADMIN — KETOROLAC TROMETHAMINE 30 MG: 15 INJECTION, SOLUTION INTRAMUSCULAR; INTRAVENOUS at 09:31

## 2025-04-30 ASSESSMENT — PAIN - FUNCTIONAL ASSESSMENT
PAIN_FUNCTIONAL_ASSESSMENT: PREVENTS OR INTERFERES SOME ACTIVE ACTIVITIES AND ADLS
PAIN_FUNCTIONAL_ASSESSMENT: 0-10

## 2025-04-30 ASSESSMENT — PAIN SCALES - GENERAL
PAINLEVEL_OUTOF10: 9
PAINLEVEL_OUTOF10: 10

## 2025-04-30 ASSESSMENT — PAIN DESCRIPTION - DESCRIPTORS
DESCRIPTORS: ACHING
DESCRIPTORS: ACHING;STABBING

## 2025-04-30 ASSESSMENT — PAIN DESCRIPTION - ORIENTATION
ORIENTATION: RIGHT
ORIENTATION: RIGHT

## 2025-04-30 ASSESSMENT — PAIN DESCRIPTION - LOCATION
LOCATION: HIP
LOCATION: HIP
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
## 2025-06-19 ENCOUNTER — HOSPITAL ENCOUNTER (EMERGENCY)
Age: 55
Discharge: HOME | End: 2025-06-19
Attending: STUDENT IN AN ORGANIZED HEALTH CARE EDUCATION/TRAINING PROGRAM
Payer: COMMERCIAL

## 2025-06-19 ENCOUNTER — APPOINTMENT (OUTPATIENT)
Dept: GENERAL RADIOLOGY | Age: 55
End: 2025-06-19
Payer: COMMERCIAL

## 2025-06-19 VITALS
TEMPERATURE: 98.3 F | RESPIRATION RATE: 16 BRPM | HEART RATE: 81 BPM | SYSTOLIC BLOOD PRESSURE: 127 MMHG | DIASTOLIC BLOOD PRESSURE: 59 MMHG | OXYGEN SATURATION: 100 % | HEIGHT: 66 IN | BODY MASS INDEX: 32.14 KG/M2 | WEIGHT: 200 LBS

## 2025-06-19 DIAGNOSIS — S73.004A DISLOCATION OF RIGHT HIP, INITIAL ENCOUNTER (HCC): Primary | ICD-10-CM

## 2025-06-19 LAB
ANION GAP SERPL CALCULATED.3IONS-SCNC: 12 MMOL/L (ref 9–17)
BASOPHILS # BLD: 0.03 K/UL
BASOPHILS NFR BLD: 0 % (ref 0–1)
BUN SERPL-MCNC: 9 MG/DL (ref 7–20)
CALCIUM SERPL-MCNC: 9 MG/DL (ref 8.3–10.6)
CHLORIDE SERPL-SCNC: 105 MMOL/L (ref 99–110)
CO2 SERPL-SCNC: 23 MMOL/L (ref 21–32)
CREAT SERPL-MCNC: 0.5 MG/DL (ref 0.6–1.1)
EOSINOPHIL # BLD: 0.18 K/UL
EOSINOPHILS RELATIVE PERCENT: 2 % (ref 0–3)
ERYTHROCYTE [DISTWIDTH] IN BLOOD BY AUTOMATED COUNT: 13.8 % (ref 11.7–14.9)
GFR, ESTIMATED: >90 ML/MIN/1.73M2
GLUCOSE SERPL-MCNC: 95 MG/DL (ref 74–99)
HCT VFR BLD AUTO: 37.3 % (ref 37–47)
HGB BLD-MCNC: 12.5 G/DL (ref 12.5–16)
IMM GRANULOCYTES # BLD AUTO: 0.04 K/UL
IMM GRANULOCYTES NFR BLD: 1 %
LYMPHOCYTES NFR BLD: 1.68 K/UL
LYMPHOCYTES RELATIVE PERCENT: 22 % (ref 24–44)
MCH RBC QN AUTO: 30.8 PG (ref 27–31)
MCHC RBC AUTO-ENTMCNC: 33.5 G/DL (ref 32–36)
MCV RBC AUTO: 91.9 FL (ref 78–100)
MONOCYTES NFR BLD: 0.57 K/UL
MONOCYTES NFR BLD: 7 % (ref 0–5)
NEUTROPHILS NFR BLD: 68 % (ref 36–66)
NEUTS SEG NFR BLD: 5.27 K/UL
PLATELET # BLD AUTO: 385 K/UL (ref 140–440)
PMV BLD AUTO: 8.4 FL (ref 7.5–11.1)
POTASSIUM SERPL-SCNC: 3.9 MMOL/L (ref 3.5–5.1)
RBC # BLD AUTO: 4.06 M/UL (ref 4.2–5.4)
SODIUM SERPL-SCNC: 140 MMOL/L (ref 136–145)
WBC OTHER # BLD: 7.8 K/UL (ref 4–10.5)

## 2025-06-19 PROCEDURE — 6370000000 HC RX 637 (ALT 250 FOR IP)

## 2025-06-19 PROCEDURE — 99157 MOD SED OTHER PHYS/QHP EA: CPT

## 2025-06-19 PROCEDURE — 2700000000 HC OXYGEN THERAPY PER DAY

## 2025-06-19 PROCEDURE — 6360000002 HC RX W HCPCS

## 2025-06-19 PROCEDURE — 99153 MOD SED SAME PHYS/QHP EA: CPT

## 2025-06-19 PROCEDURE — 80048 BASIC METABOLIC PNL TOTAL CA: CPT

## 2025-06-19 PROCEDURE — 99156 MOD SED OTH PHYS/QHP 5/>YRS: CPT

## 2025-06-19 PROCEDURE — 99152 MOD SED SAME PHYS/QHP 5/>YRS: CPT

## 2025-06-19 PROCEDURE — 73502 X-RAY EXAM HIP UNI 2-3 VIEWS: CPT

## 2025-06-19 PROCEDURE — 85025 COMPLETE CBC W/AUTO DIFF WBC: CPT

## 2025-06-19 PROCEDURE — 27265 TREAT HIP DISLOCATION: CPT

## 2025-06-19 PROCEDURE — 73501 X-RAY EXAM HIP UNI 1 VIEW: CPT

## 2025-06-19 PROCEDURE — 96374 THER/PROPH/DIAG INJ IV PUSH: CPT

## 2025-06-19 PROCEDURE — 99285 EMERGENCY DEPT VISIT HI MDM: CPT

## 2025-06-19 PROCEDURE — 6360000002 HC RX W HCPCS: Performed by: STUDENT IN AN ORGANIZED HEALTH CARE EDUCATION/TRAINING PROGRAM

## 2025-06-19 RX ORDER — OXYCODONE AND ACETAMINOPHEN 7.5; 325 MG/1; MG/1
1 TABLET ORAL EVERY 6 HOURS PRN
Qty: 12 TABLET | Refills: 0 | Status: SHIPPED | OUTPATIENT
Start: 2025-06-19 | End: 2025-06-22

## 2025-06-19 RX ORDER — OXYCODONE AND ACETAMINOPHEN 7.5; 325 MG/1; MG/1
1 TABLET ORAL ONCE
Refills: 0 | Status: COMPLETED | OUTPATIENT
Start: 2025-06-19 | End: 2025-06-19

## 2025-06-19 RX ORDER — HYDROMORPHONE HYDROCHLORIDE 1 MG/ML
0.25 INJECTION, SOLUTION INTRAMUSCULAR; INTRAVENOUS; SUBCUTANEOUS ONCE
Status: COMPLETED | OUTPATIENT
Start: 2025-06-19 | End: 2025-06-19

## 2025-06-19 RX ORDER — ONDANSETRON 4 MG/1
4 TABLET, ORALLY DISINTEGRATING ORAL 3 TIMES DAILY PRN
Qty: 21 TABLET | Refills: 0 | Status: SHIPPED | OUTPATIENT
Start: 2025-06-19

## 2025-06-19 RX ADMIN — PROPOFOL 90 MG: 10 INJECTION, EMULSION INTRAVENOUS at 17:58

## 2025-06-19 RX ADMIN — OXYCODONE HYDROCHLORIDE AND ACETAMINOPHEN 1 TABLET: 7.5; 325 TABLET ORAL at 18:19

## 2025-06-19 RX ADMIN — HYDROMORPHONE HYDROCHLORIDE 0.25 MG: 1 INJECTION, SOLUTION INTRAMUSCULAR; INTRAVENOUS; SUBCUTANEOUS at 17:16

## 2025-06-19 RX ADMIN — PROPOFOL 30 MG: 10 INJECTION, EMULSION INTRAVENOUS at 18:04

## 2025-06-19 ASSESSMENT — PAIN DESCRIPTION - DESCRIPTORS
DESCRIPTORS: POUNDING
DESCRIPTORS: ACHING

## 2025-06-19 ASSESSMENT — PAIN DESCRIPTION - ORIENTATION
ORIENTATION: RIGHT

## 2025-06-19 ASSESSMENT — PAIN DESCRIPTION - ONSET: ONSET: ON-GOING

## 2025-06-19 ASSESSMENT — PAIN DESCRIPTION - PAIN TYPE: TYPE: ACUTE PAIN

## 2025-06-19 ASSESSMENT — PAIN SCALES - GENERAL
PAINLEVEL_OUTOF10: 6
PAINLEVEL_OUTOF10: 9
PAINLEVEL_OUTOF10: 3
PAINLEVEL_OUTOF10: 9
PAINLEVEL_OUTOF10: 3
PAINLEVEL_OUTOF10: 8

## 2025-06-19 ASSESSMENT — PAIN - FUNCTIONAL ASSESSMENT: PAIN_FUNCTIONAL_ASSESSMENT: PREVENTS OR INTERFERES SOME ACTIVE ACTIVITIES AND ADLS

## 2025-06-19 ASSESSMENT — PAIN DESCRIPTION - LOCATION
LOCATION: HIP

## 2025-06-19 ASSESSMENT — LIFESTYLE VARIABLES
HOW MANY STANDARD DRINKS CONTAINING ALCOHOL DO YOU HAVE ON A TYPICAL DAY: PATIENT DOES NOT DRINK
HOW OFTEN DO YOU HAVE A DRINK CONTAINING ALCOHOL: NEVER

## 2025-06-19 ASSESSMENT — PAIN DESCRIPTION - FREQUENCY: FREQUENCY: CONTINUOUS

## 2025-06-19 NOTE — DISCHARGE INSTRUCTIONS
Please do not bear any weight on your right leg and keep your knee immobilizer on until you follow-up with your orthopedic surgeon.  It was my pleasure taking care of you in the emergency department today.  If we prescribed any medications, please be sure to take them as prescribed. Continue taking medications as prescribed by your PCP unless we discussed otherwise.   Please follow-up with the orthopedic surgeon as soon as possible.  Please don't hesitate to return to the emergency department in case of any worsening symptoms.  Wish you a speedy recovery.  Most sincerely,    Mila Sagastume CNP

## 2025-06-19 NOTE — PROCEDURES
PROCEDURE NOTE  Date: 6/19/2025   Name: Ludy Huber  YOB: 1970    Procedural sedation    Date/Time: 6/19/2025 6:16 PM    Performed by: Beny Kingsley DO  Authorized by: Beny Kingsley DO    Consent:     Consent obtained:  Verbal    Consent given by:  Patient    Risks, benefits, and alternatives were discussed: yes      Risks discussed:  Prolonged hypoxia resulting in organ damage, prolonged sedation necessitating reversal and respiratory compromise necessitating ventilatory assistance and intubation    Alternatives discussed:  Analgesia without sedation  Universal protocol:     Procedure explained and questions answered to patient or proxy's satisfaction: yes      Relevant documents present and verified: yes      Test results available: yes      Imaging studies available: yes      Required blood products, implants, devices, and special equipment available: yes      Site/side marked: yes      Immediately prior to procedure, a time out was called: yes      Patient identity confirmed:  Hospital-assigned identification number  Indications:     Procedure performed:  Dislocation reduction    Procedure necessitating sedation performed by:  Physician performing sedation    Intended level of sedation:  Moderate  Pre-sedation assessment:     NPO status caution: unable to specify NPO status      ASA classification: class 1 - normal, healthy patient      Mouth opening:  3 or more finger widths    Thyromental distance:  3 finger widths    Mallampati score:  I - soft palate, uvula, fauces, pillars visible    Neck mobility: normal      Pre-sedation assessments completed and reviewed: airway patency, hydration status, mental status, nausea/vomiting, pain level and respiratory function      History of difficult intubation: no      Pre-sedation assessment completed:  6/19/2025 5:30 PM  Immediate pre-procedure details:     Reassessment: Patient reassessed immediately prior to procedure      Reviewed: vital

## 2025-06-19 NOTE — ED PROVIDER NOTES
[facility] EMERGENCY DEPARTMENT  EMERGENCY DEPARTMENT ENCOUNTER        Pt Name: Ludy Huber  MRN: [id number]  Birthdate 1970  Date of evaluation: 6/19/2025  Provider: PAULINE Aleman CNP  PCP: Sb Mckeon APRN - NP  Note Started: 4:29 PM EDT 6/19/25       I have seen and evaluated this patient with my supervising physician Beny Kingsley DO.      CHIEF COMPLAINT       Chief Complaint   Patient presents with    Hip Pain     Right sided, recent surgery       HISTORY OF PRESENT ILLNESS: 1 or more Elements     History From: Patient    Limitations to history : None    Social Determinants Significantly Affecting Health : None    Chief Complaint: Unable to move right hip after feeling like it became displaced at 140 this afternoon    Ludy Huber is a 54 y.o. female history of recent total hip replacement on the 11th with Dr. Le who presents to ED stating she went to physical therapy this morning.  Stated her incision was healing well.  Stated she was not having much pain.  Stated after physical therapy she had been laying in the bed when she brought her left leg into the frog-leg position.  She states after she was sitting like that she relates she made a mistake when she felt a pop at 1:39 PM.  She states after feeling that pop she could no longer sit forward or lay down or move her right leg.  She states she did have full sensation of it.  Was able to wiggle her foot.  Denies any recent fevers illnesses or infections.  Denies any chest pain or shortness of breath.  Denies any difficulty with sedations in the past.    Nursing Notes were all reviewed and agreed with or any disagreements were addressed in the HPI.    REVIEW OF SYSTEMS :      Review of Systems    Positives and Pertinent negatives as per HPI.     SURGICAL HISTORY     Past Surgical History:   Procedure Laterality Date    FINGER TRIGGER RELEASE Right 5/2/2022    RIGHT RING FINGER TRIGGER RELEASE

## 2025-06-19 NOTE — ED TRIAGE NOTES
Pt reports hip surgery with Dr. Olivo on 6/11/25 and went home on 6/12/25. Today patient heard a \"pop\" today with immediate pain and swelling to right hip. Pt reports unable to sit up or bear weight

## 2025-06-19 NOTE — ED PROVIDER NOTES
THIS IS MY ALEJANDRO SUPERVISORY AND SHARED VISIT NOTE    I independently examined and evaluated Ludy Huber.    CC/HPI Summary, DDx, ED Course, and Reassessment:     History from : Patient    In brief, presents with right-sided hip pain.  Apparently had a recent hip arthroplasty on the 11th of this month.  She was at physical therapy this morning when she felt a pop and felt no longer move her leg.  No new falls.  She had an x-ray that was concerning for anterior superior dislocation of the prosthetic hip.  We did speak with her orthopedist who is comfortable with ED reduction.  She has good neurovascular status.  Procedural sedation and ED reduction performed at bedside.  Reduction was successful.  Patient was placed in a knee immobilizer.  She just picked up oxycodone from the pharmacy this morning so has pain control already at home.  Recommended strict compliance with a knee immobilizer and calling orthopedist to arrange for follow-up appointment.         Physical Exam:  Triage VS:      ED Triage Vitals   Encounter Vitals Group      BP 06/19/25 1533 134/63      Systolic BP Percentile --       Diastolic BP Percentile --       Pulse 06/19/25 1533 94      Respirations 06/19/25 1533 16      Temp 06/19/25 1533 98.3 °F (36.8 °C)      Temp src --       SpO2 06/19/25 1533 95 %      Weight - Scale 06/19/25 1710 90.7 kg (200 lb)      Height 06/19/25 1710 1.676 m (5' 6\")      Head Circumference --       Peak Flow --       Pain Score --       Pain Loc --       Pain Education --       Exclude from Growth Chart --           Physical Exam  Vitals and nursing note reviewed.   Constitutional:       General: She is not in acute distress.  HENT:      Head: Normocephalic and atraumatic.      Nose: Nose normal.      Mouth/Throat:      Mouth: Mucous membranes are moist.   Cardiovascular:      Rate and Rhythm: Normal rate.   Pulmonary:      Effort: No respiratory distress.   Musculoskeletal:      Comments: Gross deformity at the
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
## 2025-06-28 ENCOUNTER — APPOINTMENT (OUTPATIENT)
Dept: GENERAL RADIOLOGY | Age: 55
End: 2025-06-28
Attending: EMERGENCY MEDICINE
Payer: COMMERCIAL

## 2025-06-28 ENCOUNTER — HOSPITAL ENCOUNTER (EMERGENCY)
Age: 55
Discharge: HOME | End: 2025-06-28
Attending: EMERGENCY MEDICINE
Payer: COMMERCIAL

## 2025-06-28 ENCOUNTER — APPOINTMENT (OUTPATIENT)
Dept: GENERAL RADIOLOGY | Age: 55
End: 2025-06-28
Payer: COMMERCIAL

## 2025-06-28 VITALS
TEMPERATURE: 98.4 F | RESPIRATION RATE: 14 BRPM | DIASTOLIC BLOOD PRESSURE: 60 MMHG | OXYGEN SATURATION: 100 % | BODY MASS INDEX: 34.66 KG/M2 | HEART RATE: 72 BPM | SYSTOLIC BLOOD PRESSURE: 114 MMHG | WEIGHT: 208 LBS | HEIGHT: 65 IN

## 2025-06-28 DIAGNOSIS — S73.034A ANTERIOR DISLOCATION OF RIGHT HIP, INITIAL ENCOUNTER (HCC): Primary | ICD-10-CM

## 2025-06-28 PROCEDURE — 73502 X-RAY EXAM HIP UNI 2-3 VIEWS: CPT

## 2025-06-28 PROCEDURE — 99285 EMERGENCY DEPT VISIT HI MDM: CPT

## 2025-06-28 PROCEDURE — 2700000000 HC OXYGEN THERAPY PER DAY

## 2025-06-28 PROCEDURE — 2580000003 HC RX 258: Performed by: EMERGENCY MEDICINE

## 2025-06-28 PROCEDURE — 6360000002 HC RX W HCPCS: Performed by: EMERGENCY MEDICINE

## 2025-06-28 PROCEDURE — 96374 THER/PROPH/DIAG INJ IV PUSH: CPT

## 2025-06-28 PROCEDURE — 96375 TX/PRO/DX INJ NEW DRUG ADDON: CPT

## 2025-06-28 RX ORDER — 0.9 % SODIUM CHLORIDE 0.9 %
500 INTRAVENOUS SOLUTION INTRAVENOUS ONCE
Status: COMPLETED | OUTPATIENT
Start: 2025-06-28 | End: 2025-06-28

## 2025-06-28 RX ORDER — PROPOFOL 10 MG/ML
100 INJECTION, EMULSION INTRAVENOUS ONCE
Status: DISCONTINUED | OUTPATIENT
Start: 2025-06-28 | End: 2025-06-28 | Stop reason: HOSPADM

## 2025-06-28 RX ORDER — HYDROCODONE BITARTRATE AND ACETAMINOPHEN 5; 325 MG/1; MG/1
1 TABLET ORAL EVERY 6 HOURS PRN
COMMUNITY
Start: 2025-02-27

## 2025-06-28 RX ORDER — ONDANSETRON 2 MG/ML
4 INJECTION INTRAMUSCULAR; INTRAVENOUS EVERY 6 HOURS PRN
Status: DISCONTINUED | OUTPATIENT
Start: 2025-06-28 | End: 2025-06-28 | Stop reason: HOSPADM

## 2025-06-28 RX ORDER — DIAZEPAM 10 MG/2ML
5 INJECTION, SOLUTION INTRAMUSCULAR; INTRAVENOUS ONCE
Status: COMPLETED | OUTPATIENT
Start: 2025-06-28 | End: 2025-06-28

## 2025-06-28 RX ORDER — FENTANYL CITRATE 50 UG/ML
50 INJECTION, SOLUTION INTRAMUSCULAR; INTRAVENOUS ONCE
Status: COMPLETED | OUTPATIENT
Start: 2025-06-28 | End: 2025-06-28

## 2025-06-28 RX ADMIN — FENTANYL CITRATE 50 MCG: 50 INJECTION INTRAMUSCULAR; INTRAVENOUS at 05:53

## 2025-06-28 RX ADMIN — HYDROMORPHONE HYDROCHLORIDE 0.5 MG: 1 INJECTION, SOLUTION INTRAMUSCULAR; INTRAVENOUS; SUBCUTANEOUS at 08:28

## 2025-06-28 RX ADMIN — SODIUM CHLORIDE 500 ML: 9 INJECTION, SOLUTION INTRAVENOUS at 08:33

## 2025-06-28 RX ADMIN — ONDANSETRON 4 MG: 2 INJECTION INTRAMUSCULAR; INTRAVENOUS at 05:53

## 2025-06-28 RX ADMIN — DIAZEPAM 5 MG: 5 INJECTION, SOLUTION INTRAMUSCULAR; INTRAVENOUS at 07:48

## 2025-06-28 ASSESSMENT — PAIN DESCRIPTION - PAIN TYPE
TYPE: ACUTE PAIN
TYPE: ACUTE PAIN

## 2025-06-28 ASSESSMENT — PAIN SCALES - GENERAL
PAINLEVEL_OUTOF10: 7
PAINLEVEL_OUTOF10: 8
PAINLEVEL_OUTOF10: 8
PAINLEVEL_OUTOF10: 7

## 2025-06-28 ASSESSMENT — PAIN DESCRIPTION - LOCATION
LOCATION: HIP

## 2025-06-28 ASSESSMENT — PAIN DESCRIPTION - ORIENTATION
ORIENTATION: RIGHT

## 2025-06-28 ASSESSMENT — PAIN DESCRIPTION - DESCRIPTORS
DESCRIPTORS: ACHING;SHOOTING
DESCRIPTORS: ACHING;SHARP;SHOOTING

## 2025-06-28 ASSESSMENT — PAIN - FUNCTIONAL ASSESSMENT
PAIN_FUNCTIONAL_ASSESSMENT: 0-10
PAIN_FUNCTIONAL_ASSESSMENT: 0-10

## 2025-06-28 NOTE — ED PROVIDER NOTES
[facility]    Emergency Department Encounter      Patient: Ludy Huber  MRN: [id number]  : 1970  Date of Evaluation: 2025  ED Provider:  Va Narvaez DO    Triage Chief Complaint:   Hip Pain    [place]:  Ludy Huber is a 54 y.o. female who  has a past medical history of Back pain and Ovarian cyst. and presents with right hip pain which she believes is dislocated.  She had a recent dislocation of the same prosthetic a week ago.  Surgery was  with Dr. Le, HIP ORIF converstion to RT RIVERA Anterior , dislcocation 2025.      ROS - see HPI, below listed is current ROS at time of my eval:   systems reviewed and negative except as above.     Past Medical History:   Diagnosis Date    Back pain     Ovarian cyst      Past Surgical History:   Procedure Laterality Date    FINGER TRIGGER RELEASE Right 2022    RIGHT RING FINGER TRIGGER RELEASE performed by Mustapha Conde MD at [facility] OR    KNEE SURGERY Right     two times - Scoped and ACL repaired     KNEE SURGERY Left     scoped    MEDICATION INJECTION Right 2022    RIGHT WRIST INJECTION performed by Mustapha Conde MD at [facility] OR    TONSILLECTOMY      TUBAL LIGATION       Family History   Problem Relation Age of Onset    No Known Problems Mother     No Known Problems Father      Social History     Socioeconomic History    Marital status:      Spouse name: Not on file    Number of children: Not on file    Years of education: Not on file    Highest education level: Not on file   Occupational History    Not on file   Tobacco Use    Smoking status: Every Day     Current packs/day: 1.00     Types: Cigarettes    Smokeless tobacco: Never   Vaping Use    Vaping status: Never Used   Substance and Sexual Activity    Alcohol use: Yes     Comment: occasionally \"twice a month\"      Drug use: Yes     Types: Marijuana (Weed)     Comment: medical marijuana - last used 1 week  prior     Sexual activity: Not on file

## 2025-06-28 NOTE — ED TRIAGE NOTES
PT to ED via EMS CC right hip pain after her hip \"fell out\" while trying to get out of bed to go to the bathroom this morning.  PT believes that it is dislocated.  PT states PT had a right hip replacement approx 2 weeks ago.      Denies numbness/tingling.  PMS intact.  AO4. RR even and unlabored on RA

## 2025-06-28 NOTE — ED NOTES
Pt advised does have a safe ride to home. Patient placed call to ride for .   Reviewed AVS with pt. Pt verbalizes understanding of AVS.   Pt expressed no other concerns or needs at time of discharge.   Pt in no apparent distress when leaving ED with friend.

## 2025-07-30 ENCOUNTER — APPOINTMENT (OUTPATIENT)
Dept: GENERAL RADIOLOGY | Age: 55
End: 2025-07-30
Payer: COMMERCIAL

## 2025-07-30 ENCOUNTER — HOSPITAL ENCOUNTER (EMERGENCY)
Age: 55
Discharge: HOME | End: 2025-07-30
Attending: EMERGENCY MEDICINE
Payer: COMMERCIAL

## 2025-07-30 VITALS
DIASTOLIC BLOOD PRESSURE: 55 MMHG | HEIGHT: 65 IN | OXYGEN SATURATION: 100 % | WEIGHT: 210 LBS | BODY MASS INDEX: 34.99 KG/M2 | RESPIRATION RATE: 18 BRPM | TEMPERATURE: 98.9 F | SYSTOLIC BLOOD PRESSURE: 101 MMHG | HEART RATE: 72 BPM

## 2025-07-30 DIAGNOSIS — S73.006A: Primary | ICD-10-CM

## 2025-07-30 PROCEDURE — 73502 X-RAY EXAM HIP UNI 2-3 VIEWS: CPT

## 2025-07-30 PROCEDURE — 99285 EMERGENCY DEPT VISIT HI MDM: CPT

## 2025-07-30 PROCEDURE — 96376 TX/PRO/DX INJ SAME DRUG ADON: CPT

## 2025-07-30 PROCEDURE — 27250 TREAT HIP DISLOCATION: CPT

## 2025-07-30 PROCEDURE — 6360000002 HC RX W HCPCS: Performed by: EMERGENCY MEDICINE

## 2025-07-30 PROCEDURE — 96375 TX/PRO/DX INJ NEW DRUG ADDON: CPT

## 2025-07-30 PROCEDURE — 96374 THER/PROPH/DIAG INJ IV PUSH: CPT

## 2025-07-30 PROCEDURE — 2580000003 HC RX 258: Performed by: EMERGENCY MEDICINE

## 2025-07-30 RX ORDER — ONDANSETRON 2 MG/ML
4 INJECTION INTRAMUSCULAR; INTRAVENOUS EVERY 30 MIN PRN
Status: DISCONTINUED | OUTPATIENT
Start: 2025-07-30 | End: 2025-07-30 | Stop reason: HOSPADM

## 2025-07-30 RX ORDER — HYDROCODONE BITARTRATE AND ACETAMINOPHEN 5; 325 MG/1; MG/1
1 TABLET ORAL EVERY 4 HOURS PRN
Qty: 18 TABLET | Refills: 0 | Status: SHIPPED | OUTPATIENT
Start: 2025-07-30 | End: 2025-08-02

## 2025-07-30 RX ORDER — HYDROCODONE BITARTRATE AND ACETAMINOPHEN 5; 325 MG/1; MG/1
1 TABLET ORAL ONCE
Status: DISCONTINUED | OUTPATIENT
Start: 2025-07-30 | End: 2025-07-30 | Stop reason: HOSPADM

## 2025-07-30 RX ORDER — PROPOFOL 10 MG/ML
200 INJECTION, EMULSION INTRAVENOUS ONCE
Status: COMPLETED | OUTPATIENT
Start: 2025-07-30 | End: 2025-07-30

## 2025-07-30 RX ORDER — ONDANSETRON 4 MG/1
4 TABLET, ORALLY DISINTEGRATING ORAL 3 TIMES DAILY PRN
Qty: 21 TABLET | Refills: 0 | Status: SHIPPED | OUTPATIENT
Start: 2025-07-30

## 2025-07-30 RX ORDER — LIDOCAINE 4 G/G
1 PATCH TOPICAL DAILY
Qty: 30 PATCH | Refills: 0 | Status: SHIPPED | OUTPATIENT
Start: 2025-07-30 | End: 2025-08-29

## 2025-07-30 RX ORDER — 0.9 % SODIUM CHLORIDE 0.9 %
1000 INTRAVENOUS SOLUTION INTRAVENOUS ONCE
Status: COMPLETED | OUTPATIENT
Start: 2025-07-30 | End: 2025-07-30

## 2025-07-30 RX ADMIN — PROPOFOL 100 MG: 10 INJECTION, EMULSION INTRAVENOUS at 17:41

## 2025-07-30 RX ADMIN — HYDROMORPHONE HYDROCHLORIDE 1 MG: 1 INJECTION, SOLUTION INTRAMUSCULAR; INTRAVENOUS; SUBCUTANEOUS at 17:21

## 2025-07-30 RX ADMIN — ONDANSETRON 4 MG: 2 INJECTION INTRAMUSCULAR; INTRAVENOUS at 16:49

## 2025-07-30 RX ADMIN — SODIUM CHLORIDE 1000 ML: 9 INJECTION, SOLUTION INTRAVENOUS at 16:53

## 2025-07-30 RX ADMIN — HYDROMORPHONE HYDROCHLORIDE 1 MG: 1 INJECTION, SOLUTION INTRAMUSCULAR; INTRAVENOUS; SUBCUTANEOUS at 16:49

## 2025-07-30 ASSESSMENT — LIFESTYLE VARIABLES
HOW OFTEN DO YOU HAVE A DRINK CONTAINING ALCOHOL: NEVER
HOW MANY STANDARD DRINKS CONTAINING ALCOHOL DO YOU HAVE ON A TYPICAL DAY: PATIENT DOES NOT DRINK

## 2025-07-30 ASSESSMENT — PAIN DESCRIPTION - LOCATION
LOCATION: HIP
LOCATION: HIP

## 2025-07-30 ASSESSMENT — ENCOUNTER SYMPTOMS
ALLERGIC/IMMUNOLOGIC NEGATIVE: 1
EYES NEGATIVE: 1
RESPIRATORY NEGATIVE: 1
GASTROINTESTINAL NEGATIVE: 1

## 2025-07-30 ASSESSMENT — PAIN DESCRIPTION - ORIENTATION: ORIENTATION: RIGHT

## 2025-07-30 ASSESSMENT — PAIN SCALES - GENERAL
PAINLEVEL_OUTOF10: 10
PAINLEVEL_OUTOF10: 10
PAINLEVEL_OUTOF10: 5

## 2025-07-30 NOTE — ED PROVIDER NOTES
Patient Name: Ludy Huber   Medical Record Number: [id number]  Date: 7/30/2025   Time: 6:01 PM   Room/Bed: [address]  Joint Reduction Procedure Note  Indication: Right hip dislocation    Consent: The patient was counseled regarding the procedure, it's indications, risks, potential complications and alternatives and any questions were answered. Consent was obtained.    Procedure: The pre-reduction exam showed distal perfusion & neurologic function to be normal. The patient was placed in the supine position. Anesthesia/pain control was obtained using Propofol--please see Dr. Berkowitz's note for sedation details. Reduction of the right hip was performed by direct traction and internal rotation. Post reduction films were obtained and revealed satisfactory reduction. A post-reduction exam revealed distal perfusion & neurologic function to be normal.     The patient tolerated the procedure well.    Complications: None    Electronically Signed by: [email or web address]     My role in patient encounter was procedure(s) ONLY as noted above.  Please see Dr. Berkowitz's note for full details of patient encounter, including history and physical exam, any labs and imaging, and disposition.         Ana Deleon PA-C  07/30/25 [id number]

## 2025-07-30 NOTE — ED TRIAGE NOTES
PT reports slipping on rug and dislocated hip. PT reports 3rd hip dislocation since hip replacement.   100 mcg fentanyl given by ems, 10/10 pain currently in hip right side.

## 2025-07-30 NOTE — PROGRESS NOTES
RT at bedside for conscious sedation. Patient with SP02 >92% during entire procedure. No complications.

## 2025-07-30 NOTE — ED PROVIDER NOTES
[facility]      TRIAGE CHIEF COMPLAINT:   Dislocation      [place]:  Ludy Huber is a 54 y.o. female that presents with complaint of right hip dislocation.  Patient states this was her third time having her hip dislocated she had hip surgery in June for the first time.  Ever since then she has been having some dislocations.  She does follow with Ortho she was told it happens for a third time she needs revision.  She states she cannot stay in the hospital this time because she has 2 autistic is at home.  Prior to arrival she was in the bathroom she turned wrong and she felt a pop out of place no other injuries or questions or concerns came by EMS.  No other complaints..    REVIEW OF SYSTEMS:  At least 10 systems reviewed and otherwise acutely negative except as in the [place].    Review of Systems   Constitutional: Negative.    HENT: Negative.     Eyes: Negative.    Respiratory: Negative.     Cardiovascular: Negative.    Gastrointestinal: Negative.    Endocrine: Negative.    Genitourinary: Negative.    Musculoskeletal:  Positive for arthralgias and myalgias.   Skin: Negative.    Allergic/Immunologic: Negative.    Neurological: Negative.    Hematological: Negative.    Psychiatric/Behavioral: Negative.     All other systems reviewed and are negative.      Past Medical History:   Diagnosis Date    Back pain     Ovarian cyst      Past Surgical History:   Procedure Laterality Date    FINGER TRIGGER RELEASE Right 5/2/2022    RIGHT RING FINGER TRIGGER RELEASE performed by Mustapha Conde MD at [facility] OR    KNEE SURGERY Right     two times - Scoped and ACL repaired     KNEE SURGERY Left     scoped    MEDICATION INJECTION Right 5/2/2022    RIGHT WRIST INJECTION performed by Mustapha Conde MD at [facility] OR    TONSILLECTOMY      TUBAL LIGATION       Family History   Problem Relation Age of Onset    No Known Problems Mother     No Known Problems Father      Social History     Socioeconomic History    Marital

## (undated) DEVICE — GAUZE,SPONGE,4"X4",16PLY,XRAY,STRL,LF: Brand: MEDLINE

## (undated) DEVICE — PADDING CAST W2INXL4YD COT LO LINTING WYTEX

## (undated) DEVICE — THREE QUARTER SHEET: Brand: CONVERTORS

## (undated) DEVICE — NEEDLE HYPO 25GA L1.5IN BLU POLYPR HUB S STL REG BVL STR

## (undated) DEVICE — DRAPE,HAND,STERILE: Brand: MEDLINE

## (undated) DEVICE — SUTURE ETHLN SZ 4-0 L18IN NONABSORBABLE BLK L13MM P-3 3/8 699G

## (undated) DEVICE — TOWEL,OR,DSP,ST,BLUE,STD,6/PK,12PK/CS: Brand: MEDLINE

## (undated) DEVICE — COUNTER NDL 30 COUNT FOAM STRP SGL MAG

## (undated) DEVICE — PACK,BASIC,SIRUS,V: Brand: MEDLINE

## (undated) DEVICE — GOWN,SIRUS,POLYRNF,BRTHSLV,XLN/XL,20/CS: Brand: MEDLINE

## (undated) DEVICE — SYRINGE MED BLNT 18 GAX15 IN 10 CC W/ NDL FILL LUERLOCK TIP

## (undated) DEVICE — BANDAGE GZ W6XL80IN RAYON POLY CNFRM STRTCH LTWT

## (undated) DEVICE — ZIMMER® STERILE DISPOSABLE TOURNIQUET CUFF WITH PLC, DUAL PORT, SINGLE BLADDER, 24 IN. (61 CM)

## (undated) DEVICE — GLOVE SURG SZ 8 L12IN THK75MIL DK GRN LTX FREE

## (undated) DEVICE — GLOVE ORANGE PI 7 1/2   MSG9075

## (undated) DEVICE — DRESSING,GAUZE,XEROFORM,CURAD,1"X8",ST: Brand: CURAD

## (undated) DEVICE — CORD,CAUTERY,BIPOLAR,STERILE: Brand: MEDLINE

## (undated) DEVICE — SYRINGE MED LEURLOK 20 CC

## (undated) DEVICE — DRAPE SHEET ULTRAGARD: Brand: MEDLINE

## (undated) DEVICE — MARKER SURG SKIN UTIL REGULAR/FINE 2 TIP W/ RUL AND 9 LBL

## (undated) DEVICE — Z INACTIVE PER PHARM Z INACTIVE USE 2530107 SOLUTION ANTISEP 70% ISOPROPYL RUBBING ALC 16 OZ PLAS BTL

## (undated) DEVICE — INTENDED FOR TISSUE SEPARATION, AND OTHER PROCEDURES THAT REQUIRE A SHARP SURGICAL BLADE TO PUNCTURE OR CUT.: Brand: BARD-PARKER ® STAINLESS STEEL BLADES

## (undated) DEVICE — SPONGE GZ W4XL8IN COT WVN 12 PLY

## (undated) DEVICE — SYRINGE MED 10ML LUERLOCK TIP W/O SFTY DISP

## (undated) DEVICE — BANDAGE,ELASTIC,ESMARK,STERILE,4"X9',LF: Brand: MEDLINE